# Patient Record
Sex: MALE | Race: WHITE
[De-identification: names, ages, dates, MRNs, and addresses within clinical notes are randomized per-mention and may not be internally consistent; named-entity substitution may affect disease eponyms.]

---

## 2017-03-13 NOTE — EDM.PDOC
ED HPI GENERAL MEDICAL PROBLEM





- General


Chief Complaint: General


Stated Complaint: N/V, weakness, chills


Time Seen by Provider: 17 13:47


Source of Information: Reports: Patient, Family (Wife), Old records ([facility] chart/EMR)





- History of Present Illness


INITIAL COMMENTS - FREE TEXT/NARRATIVE: 





The patient was brought to the emergency room via private automobile by his 

wife for evaluation of sudden onset mild to moderate dizziness, diaphoresis, 

confusion and nausea with 2 episodes of emesis prior to arrival to our 

facility.  The patient also had an additional large emesis immediately on 

arrival to the emergency room.  Symptoms started at about 12 p.m. this 

afternoon with the patient eating lunch at about 11:30 a.m. with no complaints 

at that time.  He was released from an eye clinic in [place] at about 11 a.m. 

with left cataract surgery at that time without apparent complications.  Note 

that the patient did have anesthesia, however had no problems with anesthesia 2 

weeks ago when he had his right cataract surgery in the same office.  His Accu-

Chek at home this morning was 150 mg percent, although he has been n.p.o. since 

8 p.m. yesterday evening. The patient denies any chest pain/pressure, heart 

flutter, orthopnea, paresthesias, recent decreased exercise tolerance, or any 

other anginal-type symptoms.  He has been compliant with cardiac 

rehabilitation. No recent history of abdominal pain, heartburn, diarrhea, melena

, gross hematochezia, or any food intolerance, including fatty foods, etc. with 

a normal bowel movement earlier this morning. The patient also denies any 

recent fever, cough, wheezing, dyspnea, etc..  The patient did take his morning 

medications with exception of his diabetic medications. No history of recent 

headaches, visual changes, diplopia, or other change in neurological status.


Onset: today, sudden


Onset Date: 17


Onset Time: 12:00


Duration: Constant, Getting worse


Location: Reports: other (No pain)


Improves with: Reports: None


Worsens with: Reports: None


Context: Reports: Other (As above)


Associated Symptoms: Reports: confusion, diaphoresis, nausea/vomiting, 

weakness.  Denies: chest pain, cough, fever/chills, headaches, loss of appetite

, malaise, seizure, shortness of breath, syncope





- Related Data


 Allergies











Allergy/AdvReac Type Severity Reaction Status Date / Time


 


No Known Allergies Allergy   Verified 13 08:30











Home Meds: 


 Home Meds





Aspirin 325 mg PO DAILY 17 [History]


Carvedilol 12.5 mg PO BID 17 [History]


Cholecalciferol (Vitamin D3) [Vitamin D3] 1,000 units PO DAILY 17 [History

]


Cyanocobalamin (Vitamin B-12) [Vitamin B-12] 1,000 mcg PO DAILY 17 [

History]


Ginkgo Biloba 60 mg PO DAILY 17 [History]


Ketorolac Tromethamine [Acular] 1 drop EYELF QID 17 [History]


Latanoprost [Xalatan 0.005% Ophth Soln] 1 drop EYEBOTH BEDTIME 17 [History

]


Lisinopril 5 mg PO DAILY 17 [History]


Magnesium Oxide/Mag AA Chelate [Magnesium] 300 mg PO DAILY 17 [History]


Milk Thistle Seed Extract [Milk Thistle] 100 mg PO DAILY 17 [History]


Moxifloxacin [Vigamox 0.5% Ophth Soln] 1 drop EYELF QID 17 [History]


Naproxen Sodium [Aleve] 220 mg PO BID PRN 17 [History]


Ubidecarenone [Co Q-10] 100 mg PO DAILY 17 [History]


metFORMIN HCl [Metformin HCl] 1,000 mg PO BID 17 [History]


prednisoLONE Acetate [Pred Forte 1% Ophth Susp] 1 drop EYERT TID 17 [

History]


prednisoLONE Acetate [Pred Mild] 1 drop EYELF QID 17 [History]











Past Medical History


HEENT History: Reports: Cataract, Glaucoma, Hard of hearing, Impaired vision, 

Other (see below).  Denies: Allergic rhinitis, Macular degeneration, Retinal 

detachment


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 

current hearing aids


Cardiovascular History: Reports: Arrhythmia, CAD, Cardiomyopathy, High 

cholesterol, Hypertension, PVD, Other (see below).  Denies: Afib, Blood clots/

VTE/DVT, Bypass, Heart Failure, Heart murmur, MI, Pacemaker, PTCA, Stents, 

Syncope


Other Cardiovascular History: Incomplete right bundle branch block, left and 

right ventricular hypertrophy with left ventricular systolic dysfunction by 

echocardiogram on 02, coronary artery disease by echocardiogram with 

negative heart catheterization, dyslipidemia, mild carotid occlusive disease


Respiratory History: Reports: COPD, Intubation, previous, Pulmonary fibrosis, 

Other (see below).  Denies: Asthma, Intubation, difficult, PE, Pneumothorax, 

Sleep apnea, TB


Other Respiratory History: COPD and mild pulmonary fibrosis by chest x-ray with 

no current nebulizer therapy


Gastrointestinal History: Reports: Cholelithiasis, Chronic diarrhea, Colon polyp

, Diverticulosis, Pancreatitis, Other (see below).  Denies: Celiac disease, 

Chronic constipation, Gastritis, GERD, GI bleed, Hepatitis, Helicobacter pylori

, Hiatal hernia, Inflammatory bowel disease, Irritable bowel syndrome, Jaundice


Other Gastrointestinal History: Nonsymptomatic cholelithiasis with no surgery 

required diagnosed by CT scan on 12, pancreatitis with mild secondary ileus 

on 12, excision of 3 benign hyperplastic polyps from the rectosigmoid 

region a colonoscopy on 16, sigmoid diverticulosis


Genitourinary History: Reports: BPH, Diabetic nephropathy, Urinary incontinence

, UTI, recurrent, Other (see below).  Denies: Chronic renal insuffiency, 

Dialysis, Renal calculus, STD


Other Genitourinary History: Bladder diverticulum with recurrent UTIs initially 

diagnosed on 6/3/02, mild proteinuria secondary to his diabetes, erectile 

dysfunction, left-sided hydrocele, urosepsis in the 


Musculoskeletal History: Reports: Arthritis, Back pain, chronic, Fracture, Gout

, Neck pain, chronic, Osteoarthritis, Osteoporosis, RA, Other (see below).  

Denies: Amputation, SLE


Other Musculoskeletal History: Positive ISAAC on 02 with no history of lupus

, polymyalgia rheumatica, bilateral pes planus, clavicular fracture at age 10


Neurological History: Reports: Neuropathy, diabetic, Neuropathy, peripheral.  

Denies: Alzheimers disease, Cerebral aneurysms, Concussion, CVA, Headaches, 

chronic, Head trauma, Migraines, MS, Parkinson's, Seizure, TIA


Psychiatric History: Reports: None.  Denies: Abuse, victim of, ADD, ADHD, 

Addiction, Anxiety, Depression, Psych Hospitalization(s), PTSD, Suicide attempt

, Suicidal ideation


Endocrine/Metabolic History: Reports: Diabetes, type II, Osteoporosis, Other (

see below).  Denies: Diabetes, type I, Hypothyroidism, IDDM


Other Endocrine/Metabolic History: Gynecomastia


Hematologic History: Reports: Anemia, B12 deficiency, Blood transfusion(s), 

Other (see below)


Other Hematologic History: transfusion of 2 units on 04 postoperative 

from total knee arthroplasty as below


Immunologic History: Reports: None.  Denies: AIDS, HIV, SLE


Oncologic (Cancer) History: Reports: Bladder, Other (see below).  Denies: Basal 

cell carcinoma, Colon, Hodgkin's Lymphoma, Leukemia, Lymphoma, Malignant 

melanoma, Non-Hodgkin's Lymphoma, Prostate, Squamous cell carcinoma


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


Dermatologic History: Reports: None.  Denies: Eczema, Psoriasis





- Infectious Disease History


Infectious Disease History: Reports: Chicken pox.  Denies: C-difficile, Measles

, Meningitis, Mononucleosis, MRSA, Mumps, Pertussis (whooping cough), Rheumatic 

Fever, Rubella, Scarlet fever, Shingles, TB, VRE





- Past Surgical History


Head Surgeries/Procedures: Reports: None


HEENT Surgical History: Reports: Adenoidectomy, Cataract surgery, Oral surgery, 

Tonsillectomy, Other (see below).  Denies: Eye surgery, Laser surgery, LASIK, 

Myringotomy w tube(s), Naso-sinus surgery


Other HEENT Surgeries/Procedures: Right-sided cataract surgery on 17, left-

sided cataract surgery on 3/13/17 tonsillectomy and adenoidectomy in 1949, 

[place] teeth extraction x4 in 1952


Cardiovascular Surgical History: Reports: None.  Denies: Varicose, Vascular 

surgery


Respiratory Surgical History: Reports: None.  Denies: Lung Biopsies, 

Thoracentesis


GI Surgical History: Reports: Colonoscopy, Hernia, inguinal, Polypectomy, Other 

(see below).  Denies: Appendectomy, Cholecystectomy, EGD, Hernia, abdominal, 

Hernia repair/other


Other GI Surgeries/Procedures: Last colonoscopy on 3/27/12 with previous 

colonoscopy and polypectomy as above on 06, right inguinal hernia repair 

on 12


Male  Surgical History: Reports: TUIP, Other (see below).  Denies: 

Circumcision, Renal Calculus


Other Male  Surgeries/Procedures: Right-sided testicular orchiectomy for 

benign disease in , left hydrocele repair on 05, TUIP with fulguration 

for bladder diverticulum on 02


Endocrine Surgical History: Reports: None


Neurological Surgical History: Reports: None.  Denies: C-Spine, Discectomy, 

Laminectomy, Lumbar spine, Sacral Spine, Vertebroplasty, Other (see below)


Musculoskeletal Surgical History: Reports: Joint replacement, Knee replacement, 

Shoulder surgery, Other (see below).  Denies: Carpal tunnel, Ganglion cyst


Other Musculoskeletal Surgeries/Procedures:: Bilateral total knee arthroplasty 

on 12/10/04, right rotator cuff repair on 01


Oncologic Surgical History: Reports: None


Dermatological Surgical History: Reports: None





- Past Imaging History


Past Imaging History: Reports: Angiography (Heart catheterization on 02), 

Cardiac echo (02), Carotid US (07), CAT scan (CT scan of the abdomen 

and pelvis on 12, CT of the brain on 11), DEXA scan (Last DEXA scan on 

13 with previous evaluation on 11), Ultrasound (Abdominal aortic 

ultrasound on 07, bladder ultrasound on 6/3/02)





Social & Family History





- Family History


HEENT: Reports: None.  Denies: Allergic rhinitis, Glaucoma, Macular degeneration

, Retinal detachment


Cardiac: Reports: CAD, MI, Other (see below).  Denies: Aneurysm, Arrhythmia, 

Blood clots/VTE/DVT, Bypass, Heart failure, High cholesterol, Hypertension, 

Syncope


Other Cardiac Family History: Father with possible fatal MI in his 70s, mother 

with possible fatal MI in her 70s


Respiratory: Reports: None.  Denies: PE


GI: Reports: None.  Denies: Celiac disease, Colon polyps, GERD, GI bleed, 

Inflammatory bowel disease, Irritable bowel syndrome, PUD


: Reports: None.  Denies: Dialysis, Renal calculus


OBGYN: Reports: None.  Denies: Endometriosis, Recurrent spontaneous 


Musculoskeletal: Reports: Osteoarthritis, Other (see below).  Denies: Gout, RA, 

SLE


Other Musculoskeletal Family History: Father, 2 paternal aunts


Neurological: Reports: None.  Denies: Alzheimers disease, Cerebral aneurysms, 

CVA, Dementia, MS, Parkinson's, Seizure, TIA


Psychiatric: Reports: None.  Denies: Abuse, victim of, ADD, ADHD, Anxiety, 

Depression, Psych hospitalization(s), Suicide attempt


Endocrine/Metabolic: Reports: Diabetes, type II, Other (see below).  Denies: 

Hypoparathyroidism, Hypothyroidism, IDDM


Other Endocrine/Metabolic Family History: Father with possible diabetes mellitus


Hematologic: Reports: None.  Denies: Anemia


Immunologic: Reports: None.  Denies: AIDS, HIV, SLE


Dermatologic: Reports: None.  Denies: Eczema, Psoriasis, Seborrheic dermatitis


Oncologic: Reports: Prostate, Other (see below).  Denies: Hodgkin's lymphoma, 

Leukemia, Metastatic, Non-Hodgkin's lymphoma, Skin


Other Oncologic Family History: Father with possible prostate cancer





- Tobacco Use


Smoking Status *Q: Former Smoker


Tobacco Use Within Last Twelve Months: No


Years of Tobacco use: 14


Packs/Tins Daily: 0.8 (Patient smoked 3/4 pack of cigarettes per day between 

ages 21 and 35 with additional occasional cigar use)


Second Hand Smoke Exposure: No





- Caffeine Use


Caffeine Use: Reports: Coffee (2 cups of coffee per day).  Denies: Energy drinks

, Soda, Tea





- Alcohol Use


Alcohol Use History: Yes


Days Per Week of Alcohol Use: 0 (No use since  with borderline alcohol 

abuse with no treatment prior to that time)


Alcohol Use in Last Twelve Months: No





- Recreational Drug Use


Recreational Drug Use: No


Drug Use in Last 12 Months: No


Recreational Drug Type: Denies: Amphetamines (Speed), Cocaine, Heroin, 

Inhalants (Glues, Solvents, Aerosols), LSD (Acid), Marijuana/Hashish, 

Methamphetamine





- Living Situation & Occupation


Living situation: Reports:  ([id number], 2 children, semiretired farmer, toy 

tractor )


Occupation: retired (As above)





ED ROS GENERAL





- Review of Systems


Review Of Systems: See Below


Constitutional: Reports: no symptoms.  Denies: fever, chills, malaise, weakness

, fatigue, night sweats, diaphoresis, decreased appetite, weight loss, weight 

gain


HEENT: Reports: Glasses, Other (Cataract surgery today as above).  Denies: 

Contact Lenses, Dental pain, Ear discharge, Ear pain, Hearing loss, Rhinitis, 

Sinus problem, Throat pain, Vertigo, Vision change


Respiratory: Reports: no symptoms.  Denies: shortness of breath, wheezing, 

pleuritic chest pain, cough, hemoptysis


Cardiovascular: Reports: Blood pressure problem, Lightheadedness, Orthopnea.  

Denies: Chest pain, Claudication, Dyspnea on exertion, Palpitations, Syncope


Endocrine: Reports: no symptoms.  Denies: fatigue


GI/Abdominal: Reports: Nausea, Vomiting.  Denies: Abdominal pain, Anorexia, 

Black stool, Bloody stool, Constipation, Diarrhea, Decreased appetite, 

Difficulty swallowing, Distension, Hematochezia, Melena, Stool incontinence


: Reports: no symptoms.  Denies: discharge, dysuria, flank pain, frequency, 

incontinence, pain, urgency, urinary retention


Musculoskeletal: Reports: no symptoms.  Denies: neck pain, shoulder pain, arm 

pain, back pain, leg pain


Skin: Reports: diaphoresis.  Denies: cyanosis, bruising, pruritis, wound


Neurological: Reports: dizziness, weakness.  Denies: confusion, headache, 

numbness, paresthesia, seizure, syncope, tingling, trouble speaking, difficulty 

walking, change in speech, gait disturbance


Psychiatric: Reports: Confusion.  Denies: Agitation, Anxiety, Cravings, 

Depression


Hematologic/Lymphatic: Reports: no symptoms


Immunologic: Reports: no symptoms





ED EXAM, GENERAL





- Physical Exam


Exam: See Below


Exam Limited By: No limitations


General Appearance: alert, WD/WN, no apparent distress


Eye Exam: left eye: abnormal pupil (Dilated left pupils secondary to cataract 

surgery earlier this morning), bilateral eye: EOMI, normal fundi


Ears: normal external exam, normal canal, normal TMs, hearing loss (Bilateral 

presbycusis-mild)


Nose: normal inspection, normal mucosa, no blood


Throat/Mouth: Normal inspection, Normal lips, Normal teeth, Normal gums, Normal 

oropharynx, Normal voice, No airway compromise.  No: Dysphagia, Perioral 

cyanosis


Head: atraumatic, normocephalic.  No: facial swelling, facial tenderness, sinus 

tenderness


Neck: supple, non-tender, full range of motion, carotid bruit (Bilateral 

carotid bruits-mild).  No: lymphadenopathy (L), lymphadenopathy (R), thyromegaly


Respiratory/Chest: no respiratory distress, lungs clear, normal breath sounds, 

no accessory muscle use, chest non-tender.  No: retractions


Cardiovascular: normal peripheral pulses, no edema, no gallop, no JVD, no murmur

, no rub, tachycardia (Regular rhythm).  No: gallop/S3, gallop/S4, friction rub


Peripheral Pulses: 0: dorsalis pedis (L), dorsalis pedis (R), 2+: radial (L), 

radial (R)


GI/Abdominal: normal bowel sounds, soft, non tender, no organomegaly, no 

distention, no abnormal bruit, no mass.  No: guarding


 (Male) Exam: Deferred


Rectal (Males) Exam: Deferred


Back Exam: normal inspection, full range of motion.  No: CVA tenderness (L), 

CVA tenderness (R), muscle spasm


Extremities: normal inspection, normal range of motion, non-tender, no pedal 

edema, normal capillary refill.  No: Sree's Sign


Neurological: alert, oriented, CN II-XII intact, normal gait, normal reflexes (

Negative Babinski's), no motor/sensory deficits, confused (Mild confusion)


Psychiatric: normal affect, normal mood


Skin Exam: Warm, Dry, Intact, Normal color, No rash.  No: Diaphoretic, Pallor, 

Rash, Wound/incision


Lymphatic: no adenopathy





EKG INTERPRETATION


EKG Date: 17


Time: 14:22


Rhythm: other (Sinus tachycardia)


Rate (beats/min): 116


Axis: LAD-left axis deviation (Extended left cardiac axis)


P-wave: enlarged (Moderate diffuse biphasic P waves with extreme poor R-wave 

progression anteriorly)


QRS: wide (QRS interval of 0.10 seconds representing repolarization changes)


ST-T: normal


QT: normal


DE/PQ Interval: 0.18 seconds


Comparison: change from previous EKG (As below)


EKG Interpretation Comments: 





1.  No acute ischemic change


2.  Probable left ventricular enlargement


3.  Sinus tachycardia


4.  Repolarization changes since borderline incomplete right bundle branch block





Improvement of sinus tachycardia since EKG today at 13:57 hours with no other 

changes in the EKG and no change since last previous distant EKG on 06 

with exception of resolution of previous T-wave inversions in leads 3 and V1





Course





- Vital Signs


Last Recorded V/S: 


 Last Vital Signs











Temp  36.6 C   17 14:00


 


Pulse  107 H  17 15:22


 


Resp  26 H  17 15:22


 


BP  98/62   17 15:22


 


Pulse Ox  98   17 15:22














 Vital Signs - 24 hr











  17





  14:00 14:15 14:17


 


Temperature [ 36.6 C  





Oral]   


 


Pulse,   143 H





Peripheral   


 


Pulse, 150 H 160 H 





Peripheral [   





Right Brachial]   


 


Respiratory 25 H 25 H 





Rate   


 


Blood Pressure   123/99 H


 


Blood Pressure   





[Left Upper Arm   





]   


 


Blood Pressure 125/99 H 130/114 H 





[Right Upper   





Arm]   


 


O2 Sat by Pulse 92 L 92 L 





Oximetry   














  17





  14:20 14:25 14:35


 


Temperature [   





Oral]   


 


Pulse,  121 H 





Peripheral   


 


Pulse,   112 H





Peripheral [   





Right Brachial]   


 


Respiratory   23 H





Rate   


 


Blood Pressure  114/92 H 


 


Blood Pressure 114/92 H  92/62





[Left Upper Arm   





]   


 


Blood Pressure   





[Right Upper   





Arm]   


 


O2 Sat by Pulse   92 L





Oximetry   














  17





  14:50 15:22


 


Temperature [  





Oral]  


 


Pulse,  





Peripheral  


 


Pulse,  107 H





Peripheral [  





Right Brachial]  


 


Respiratory 22 H 26 H





Rate  


 


Blood Pressure  


 


Blood Pressure 90/60 98/62





[Left Upper Arm  





]  


 


Blood Pressure  





[Right Upper  





Arm]  


 


O2 Sat by Pulse 90 L 98





Oximetry  














- Orders/Labs/Meds


Orders: 


 Active Orders 24 hr











 Category Date Time Status


 


 Blood Glucose Check, Bedside [RC] STAT Care  17 13:48 Active


 


 Cardiac Monitoring [RC] .AS DIRECTED Care  17 13:47 Active


 


 EKG Documentation Completion [RC] ASDIRECTED Care  17 13:47 Active


 


 EKG Documentation Completion [RC] ASDIRECTED Care  17 14:20 Active


 


 Oxygen Therapy, ED [RC] CONTINUOUS Care  17 13:47 Active


 


 Peripheral IV Care [RC] .AS DIRECTED Care  17 13:47 Active


 


 Pulse Oximetry [RC] CONTINUOUS Care  17 13:47 Active


 


 Up With Assistance [RC] PFP Care  17 13:47 Active


 


 Vital Signs [RC] PFP Care  17 13:47 Active


 


 Nothing per Oral Now Diet [DIET] Diet  17 Breakfast Active


 


 Abdomen 1V Flat [CR] Stat Exams  17 14:52 Taken


 


 Chest 1V Frontal [CR] Stat Exams  17 13:47 Taken


 


 Chest PE [Ang Chest] [CT] Stat Exams  17 14:38 Taken


 


 Sodium Chloride 0.9% [Saline Flush] Med  17 13:47 Active





 10 ml FLUSH ASDIRECTED PRN   


 


 Obtain Past Medical Record [OM.PC] Urgent Oth  17 13:47 Active


 


 Peripheral IV Insertion Adult [OM.PC] Stat Oth  17 13:47 Ordered


 


 Resuscitation Status Stat Resus Stat  17 13:47 Ordered








 Medication Orders





Sodium Chloride (Saline Flush)  10 ml FLUSH ASDIRECTED PRN


   PRN Reason: Keep Vein Open


   Last Admin: 17 14:30  Dose: 10 ml


   Admin: 17 14:19  Dose: 10 ml








Labs: 


 Laboratory Tests











  17 Range/Units





  14:00 14:00 14:00 


 


WBC  5.3    (4.0-10.2)  K/uL


 


RBC  5.40    (4.33-5.41)  M/uL


 


Hgb  16.2  D    (13.1-16.8)  g/dL


 


Hct  48.9    (39.0-49.0)  %


 


MCV  90.6    (84.0-98.0)  fL


 


MCH  30.0    (28.2-33.3)  pg


 


MCHC  33.1    (31.7-36.0)  g/dL


 


RDW  12.9    (11.2-14.1)  %


 


Plt Count  210    (150-350)  K/uL


 


Neut % (Auto)  89.2 H    (45.0-80.0)  %


 


Lymph % (Auto)  10.2    (10.0-50.0)  %


 


Mono % (Auto)  0.2 L    (2.0-14.0)  %


 


Eos % (Auto)  0.2    (0.0-5.0)  %


 


Baso % (Auto)  0.2    (0.0-2.0)  %


 


Neut #  4.73    (1.40-7.00)  K/uL


 


Lymph #  0.54    (0.50-3.50)  K/uL


 


Mono #  0.01    (0.00-1.00)  K/uL


 


Eos #  0.01    (0.00-0.50)  K/uL


 


Baso #  0.01    (0.00-0.20)  K/uL


 


PT   12.2 H   (9.8-11.7)  SEC


 


INR   1.1   


 


APTT   23.6   (23.5-30.0)  SEC


 


D-Dimer, Quantitative    3790 H  (0-400)  ng/mL


 


Sodium     (136-145)  mmol/L


 


Potassium     (3.5-5.1)  mmol/L


 


Chloride     ()  mmol/L


 


Carbon Dioxide     (21.0-32.0)  mmol/L


 


BUN     (7-18)  mg/dL


 


Creatinine     (0.51-1.17)  mg/dL


 


Est Cr Clr Drug Dosing     mL/min


 


Estimated GFR (MDRD)     mL/min


 


Glucose     ()  mg/dL


 


Hemoglobin A1c     (4.3-5.7)  %


 


Lactic Acid     (0.4-2.0)  mmol/L


 


Uric Acid     (2.6-7.2)  mg/dL


 


Calcium     (8.5-10.1)  mg/dL


 


Magnesium     (1.8-2.4)  mg/dL


 


Total Bilirubin     (0.2-1.0)  mg/dL


 


AST     (15-37)  U/L


 


ALT     (12-78)  U/L


 


Alkaline Phosphatase     ()  IU/L


 


Creatine Kinase     ()  U/L


 


Creatine Kinase Index     (0.0-2.5)  %


 


CK-MB (CK-2)     (0.00-3.60)  ng/mL


 


Troponin I     (0.000-0.056)  ng/mL


 


Pro-B-Natriuretic Pept     (0-125)  pg/mL


 


Total Protein     (6.4-8.2)  g/dL


 


Albumin     (3.4-5.0)  g/dL


 


Amylase     ()  U/L


 


Lipase     ()  U/L


 


TSH, Ultra Sensitive     (0.358-3.740)  mIU/mL


 


H. pylori IgG Antibody     (NEGATIVE)  














  17 Range/Units





  14:00 14:00 14:00 


 


WBC     (4.0-10.2)  K/uL


 


RBC     (4.33-5.41)  M/uL


 


Hgb     (13.1-16.8)  g/dL


 


Hct     (39.0-49.0)  %


 


MCV     (84.0-98.0)  fL


 


MCH     (28.2-33.3)  pg


 


MCHC     (31.7-36.0)  g/dL


 


RDW     (11.2-14.1)  %


 


Plt Count     (150-350)  K/uL


 


Neut % (Auto)     (45.0-80.0)  %


 


Lymph % (Auto)     (10.0-50.0)  %


 


Mono % (Auto)     (2.0-14.0)  %


 


Eos % (Auto)     (0.0-5.0)  %


 


Baso % (Auto)     (0.0-2.0)  %


 


Neut #     (1.40-7.00)  K/uL


 


Lymph #     (0.50-3.50)  K/uL


 


Mono #     (0.00-1.00)  K/uL


 


Eos #     (0.00-0.50)  K/uL


 


Baso #     (0.00-0.20)  K/uL


 


PT     (9.8-11.7)  SEC


 


INR     


 


APTT     (23.5-30.0)  SEC


 


D-Dimer, Quantitative     (0-400)  ng/mL


 


Sodium  141    (136-145)  mmol/L


 


Potassium  4.7    (3.5-5.1)  mmol/L


 


Chloride  104    ()  mmol/L


 


Carbon Dioxide  23.0    (21.0-32.0)  mmol/L


 


BUN  20 H    (7-18)  mg/dL


 


Creatinine  1.22 H    (0.51-1.17)  mg/dL


 


Est Cr Clr Drug Dosing  51.24    mL/min


 


Estimated GFR (MDRD)  57    mL/min


 


Glucose  226 H    ()  mg/dL


 


Hemoglobin A1c     (4.3-5.7)  %


 


Lactic Acid   7.7 H   (0.4-2.0)  mmol/L


 


Uric Acid  7.0    (2.6-7.2)  mg/dL


 


Calcium  9.3    (8.5-10.1)  mg/dL


 


Magnesium  1.4 L    (1.8-2.4)  mg/dL


 


Total Bilirubin  0.7    (0.2-1.0)  mg/dL


 


AST  23    (15-37)  U/L


 


ALT  26    (12-78)  U/L


 


Alkaline Phosphatase  75    ()  IU/L


 


Creatine Kinase  114    ()  U/L


 


Creatine Kinase Index  1.4    (0.0-2.5)  %


 


CK-MB (CK-2)  1.60    (0.00-3.60)  ng/mL


 


Troponin I  0.008    (0.000-0.056)  ng/mL


 


Pro-B-Natriuretic Pept  364 H    (0-125)  pg/mL


 


Total Protein  7.1    (6.4-8.2)  g/dL


 


Albumin  3.6    (3.4-5.0)  g/dL


 


Amylase  54    ()  U/L


 


Lipase  153    ()  U/L


 


TSH, Ultra Sensitive  2.569    (0.358-3.740)  mIU/mL


 


H. pylori IgG Antibody    Negative  (NEGATIVE)  














  17 Range/Units





  14:00 


 


WBC   (4.0-10.2)  K/uL


 


RBC   (4.33-5.41)  M/uL


 


Hgb   (13.1-16.8)  g/dL


 


Hct   (39.0-49.0)  %


 


MCV   (84.0-98.0)  fL


 


MCH   (28.2-33.3)  pg


 


MCHC   (31.7-36.0)  g/dL


 


RDW   (11.2-14.1)  %


 


Plt Count   (150-350)  K/uL


 


Neut % (Auto)   (45.0-80.0)  %


 


Lymph % (Auto)   (10.0-50.0)  %


 


Mono % (Auto)   (2.0-14.0)  %


 


Eos % (Auto)   (0.0-5.0)  %


 


Baso % (Auto)   (0.0-2.0)  %


 


Neut #   (1.40-7.00)  K/uL


 


Lymph #   (0.50-3.50)  K/uL


 


Mono #   (0.00-1.00)  K/uL


 


Eos #   (0.00-0.50)  K/uL


 


Baso #   (0.00-0.20)  K/uL


 


PT   (9.8-11.7)  SEC


 


INR   


 


APTT   (23.5-30.0)  SEC


 


D-Dimer, Quantitative   (0-400)  ng/mL


 


Sodium   (136-145)  mmol/L


 


Potassium   (3.5-5.1)  mmol/L


 


Chloride   ()  mmol/L


 


Carbon Dioxide   (21.0-32.0)  mmol/L


 


BUN   (7-18)  mg/dL


 


Creatinine   (0.51-1.17)  mg/dL


 


Est Cr Clr Drug Dosing   mL/min


 


Estimated GFR (MDRD)   mL/min


 


Glucose   ()  mg/dL


 


Hemoglobin A1c  5.9 H  (4.3-5.7)  %


 


Lactic Acid   (0.4-2.0)  mmol/L


 


Uric Acid   (2.6-7.2)  mg/dL


 


Calcium   (8.5-10.1)  mg/dL


 


Magnesium   (1.8-2.4)  mg/dL


 


Total Bilirubin   (0.2-1.0)  mg/dL


 


AST   (15-37)  U/L


 


ALT   (12-78)  U/L


 


Alkaline Phosphatase   ()  IU/L


 


Creatine Kinase   ()  U/L


 


Creatine Kinase Index   (0.0-2.5)  %


 


CK-MB (CK-2)   (0.00-3.60)  ng/mL


 


Troponin I   (0.000-0.056)  ng/mL


 


Pro-B-Natriuretic Pept   (0-125)  pg/mL


 


Total Protein   (6.4-8.2)  g/dL


 


Albumin   (3.4-5.0)  g/dL


 


Amylase   ()  U/L


 


Lipase   ()  U/L


 


TSH, Ultra Sensitive   (0.358-3.740)  mIU/mL


 


H. pylori IgG Antibody   (NEGATIVE)  








Stat Accu-Chek on arrival 160 mg percent 


Meds: 


Medications











Generic Name Dose Route Start Last Admin





  Trade Name Freq  PRN Reason Stop Dose Admin


 


Sodium Chloride  10 ml  17 13:47  17 14:30





  Saline Flush  FLUSH   10 ml





  ASDIRECTED PRN   Administration





  Keep Vein Open   














Discontinued Medications














Generic Name Dose Route Start Last Admin





  Trade Name Freq  PRN Reason Stop Dose Admin


 


Famotidine  40 mg  17 13:47  17 14:29





  Pepcid  IVPUSH  17 13:48  40 mg





  ONETIME ONE   Administration


 


Iopamidol  100 ml  17 14:40  17 15:01





  Isovue-370 (76%)  IVPUSH  17 14:41  100 ml





  ONETIME ONE   Administration


 


Metoprolol Tartrate  2.5 mg  17 14:03  17 14:17





  Lopressor  IVPUSH  17 14:04  2.5 mg





  ONETIME ONE   Administration


 


Metoprolol Tartrate  2.5 mg  17 14:19  17 14:25





  Lopressor  IVPUSH  17 14:20  2.5 mg





  ONETIME ONE   Administration


 


Ondansetron HCl  4 mg  17 14:03  17 14:19





  Zofran  IVPUSH  17 14:04  4 mg





  ONETIME ONE   Administration


 


Ondansetron HCl  4 mg  17 14:52  17 15:10





  Zofran  IVPUSH  17 14:53  4 mg





  ONETIME ONE   Administration














- Radiology Interpretation


Free Text/Narrative:: 





Cardiac monitor showed heart rate in the 100s to 110s after treatment in the 

emergency room as above with initial cardiac monitoring by means of EKG machine 

with heart rates in the 130s to 160s showing sinus tachycardia, however no 

other ectopy or arrhythmia





Chest x-ray, portable, shows evidence of moderate COPD changes with probable 

pulmonary hypertension and mild centralized CHF with mild prominence of the 

proximal aortic arch, however no cardiomegaly, pulmonary infiltrates, 

pneumothorax, etc.





Abdominal x-rays, flat, shows moderate bowel gaseous distention with no free air

, ileus, or obstruction with phleboliths noted in the pelvic region.  Possible 

left renal nephrolithiasis





Telephone consultation at 15:36 hours with the radiology department at [facility] with negative CTA of the chest for PE, etc.


CT Results Date: 17


CT Results Time: 15:36





Departure





- Departure


Time of Disposition: 16:10


Disposition: Refer to Observation


Condition: good


Clinical Impression: 


 Sinus tachycardia, D-dimer, elevated, Hypomagnesemia, Lactic acid blood 

increased





Coronary artery disease


Qualifiers:


 Coronary Disease-Associated Artery/Lesion type: native artery Native vs. 

transplanted heart: native heart Associated angina: without angina Qualified 

Code(s): I25.10 - Atherosclerotic heart disease of native coronary artery 

without angina pectoris





CHF (congestive heart failure)


Qualifiers:


 Congestive heart failure type: systolic Congestive heart failure chronicity: 

acute on chronic Qualified Code(s): I50.23 - Acute on chronic systolic (

congestive) heart failure





Diabetes mellitus


Qualifiers:


 Diabetes mellitus type: type 2 Diabetes mellitus complication status: with 

kidney complications Diabetes mellitus complication detail: with 

microalbuminuria Diabetes mellitus long term insulin use: without long term use 

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 

complication





Osteoarthritis


Qualifiers:


 Osteoarthritis location: multiple joints Osteoarthritis type: primary 

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





COPD (chronic obstructive pulmonary disease)


Qualifiers:


 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 

Panlobular emphysema





Nausea & vomiting


Qualifiers:


 Vomiting type: unspecified Vomiting Intractability: non-intractable Qualified 

Code(s): R11.2 - Nausea with vomiting, unspecified





Cataract


Qualifiers:


 Cataract type: age-related Age-related cataract type: cortical Laterality: 

bilateral Qualified Code(s): H25.013 - Cortical age-related cataract, bilateral








- Problem List & Annotations


(1) Coronary artery disease


SNOMED Code(s): 67037840


   Code(s): I25.10 - ATHSCL HEART DISEASE OF NATIVE CORONARY ARTERY W/O ANG 

PCTRS   Status: Chronic   Priority: High   Current Visit: Yes   Annotation/

Comment:: Chest pain protocol not initiated in the emergency room secondary to 

absence of chest pain  and also secondary to his eye surgery earlier today with 

ASA and Plavix not given.  Patient did require IV Lopressor therapy secondary 

to sinus tachycardia as below, however.  Initiate  standard routine rule out MI 

orders.  Cardiology consultation on an as-needed basis.  Consider Cardiolite 

evaluation on an outpatient basis   


Qualifiers: 


   Coronary Disease-Associated Artery/Lesion type: native artery   Native vs. 

transplanted heart: native heart   Associated angina: without angina   

Qualified Code(s): I25.10 - Atherosclerotic heart disease of native coronary 

artery without angina pectoris   





(2) Sinus tachycardia


SNOMED Code(s): 27857328


   Code(s): R00.0 - TACHYCARDIA, UNSPECIFIED   Status: Acute   Priority: High   

Current Visit: Yes   Onset Date: 17   Annotation/Comment:: Overall good 

response to 2 doses of low-dose IV Lopressor.  Continue cardiac monitoring and 

cardiac workup as below. Consider event monitor, etc. depending on his clinical 

course.  TSH is normal   





(3) CHF (congestive heart failure)


SNOMED Code(s): 77928826


   Code(s): I50.9 - HEART FAILURE, UNSPECIFIED   Status: Acute   Priority: 

Medium   Current Visit: Yes   Onset Date: 17   Annotation/Comment:: Mild 

borderline CHF secondary to sinus tachycardia.  No chest pain or anginal 

complaints with only mild BNP elevation with secondary changes troponin I was 

otherwise negative EKG.  No clinically significant CHF by exam today.  

Echocardiogram in the a.m.   


Qualifiers: 


   Congestive heart failure type: systolic   Congestive heart failure chronicity

: acute on chronic   Qualified Code(s): I50.23 - Acute on chronic systolic (

congestive) heart failure   





(4) COPD (chronic obstructive pulmonary disease)


SNOMED Code(s): 80070509


   Code(s): J44.9 - CHRONIC OBSTRUCTIVE PULMONARY DISEASE, UNSPECIFIED   Status

: Chronic   Priority: Medium   Current Visit: Yes   Annotation/Comment:: No 

recent fever or bronchitic-type symptoms with patient not currently under 

nebulizer treatments, etc.  Consider PFTs on an outpatient basis.  No evidence 

of aspiration today   


Qualifiers: 


   COPD type: emphysema   Emphysema type: panlobular   Qualified Code(s): J43.1 

- Panlobular emphysema   





(5) Cataract


SNOMED Code(s): 938321135


   Code(s): H26.9 - UNSPECIFIED CATARACT   Status: Acute   Priority: High   

Current Visit: Yes   Onset Date: 17   Annotation/Comment:: Left sided 

cataract surgery today.  Possible complications from anesthesia versus cardiac 

etiology as above.  Ophthalmology consultation depending on clinical course   


Qualifiers: 


   Cataract type: age-related   Age-related cataract type: cortical   Laterality

: bilateral   Qualified Code(s): H25.013 - Cortical age-related cataract, 

bilateral   





(6) D-dimer, elevated


SNOMED Code(s): 055391283


   Code(s): R79.89 - OTHER SPECIFIED ABNORMAL FINDINGS OF BLOOD CHEMISTRY   

Status: Acute   Current Visit: Yes   





(7) Diabetes mellitus


SNOMED Code(s): 01450369


   Code(s): E11.9 - TYPE 2 DIABETES MELLITUS WITHOUT COMPLICATIONS   Status: 

Chronic   Priority: Medium   Current Visit: Yes   Annotation/Comment:: Accu-

Chek stable at home by patient history.  Glycosylated hemoglobin today   


Qualifiers: 


   Diabetes mellitus type: type 2   Diabetes mellitus complication status: with 

kidney complications   Diabetes mellitus complication detail: with 

microalbuminuria   Diabetes mellitus long term insulin use: without long term 

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 

kidney complication; R80.9 - Proteinuria, unspecified   





(8) Hypomagnesemia


SNOMED Code(s): 674659464


   Code(s): E83.42 - HYPOMAGNESEMIA   Status: Acute   Priority: Medium   

Current Visit: Yes   Onset Date: 17   Annotation/Comment:: Initially 

magnesium oxide therapy   





(9) Lactic acid blood increased


SNOMED Code(s): 1829769


   Code(s): R79.89 - OTHER SPECIFIED ABNORMAL FINDINGS OF BLOOD CHEMISTRY   

Status: Acute   Priority: High   Current Visit: Yes   Onset Date: 17   

Annotation/Comment:: Lactic acid to be repeated with next set of cardiac 

enzymes.  No true acidosis noted.  Observe his borderline confusion closely 

with neuro checks with vitals   





(10) Nausea & vomiting


SNOMED Code(s): 08777256


   Code(s): R11.2 - NAUSEA WITH VOMITING, UNSPECIFIED   Status: Acute   Priority

: High   Current Visit: Yes   Onset Date: 17   Annotation/Comment:: 

Repeat blood work in the a.m. with additional acute abdominal x-rays in the 

morning. Abdominal assessments with vitals.  Symptoms resolved with 2 doses of 

IV Zofran.  IV fluids with caution secondary to his weakness and NPO status 

since yesterday.  H. pylori, amylase, and lipase are normal    


Qualifiers: 


   Vomiting type: unspecified   Vomiting Intractability: non-intractable   

Qualified Code(s): R11.2 - Nausea with vomiting, unspecified   





(11) Osteoarthritis


SNOMED Code(s): 989099046


   Code(s): M19.90 - UNSPECIFIED OSTEOARTHRITIS, UNSPECIFIED SITE   Status: 

Chronic   Priority: Medium   Current Visit: Yes   Annotation/Comment:: Stable 

by history.  History of hyperuricemia and rheumatoid arthritis   


Qualifiers: 


   Osteoarthritis location: multiple joints   Osteoarthritis type: primary   

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   





- Problem List Review


Problem List Initiated/Reviewed/Updated: Yes





- My Orders


Last 24 Hours: 


My Active Orders





17 13:47


Cardiac Monitoring [RC] .AS DIRECTED 


EKG Documentation Completion [RC] ASDIRECTED 


Oxygen Therapy, ED [RC] CONTINUOUS 


Peripheral IV Care [RC] .AS DIRECTED 


Pulse Oximetry [RC] CONTINUOUS 


Up With Assistance [RC] PFP 


Vital Signs [RC] PFP 


Chest 1V Frontal [CR] Stat 


Sodium Chloride 0.9% [Saline Flush]   10 ml FLUSH ASDIRECTED PRN 


Obtain Past Medical Record [OM.PC] Urgent 


Peripheral IV Insertion Adult [OM.PC] Stat 


Resuscitation Status Stat 





17 13:48


Blood Glucose Check, Bedside [RC] STAT 





17 14:20


EKG Documentation Completion [RC] ASDIRECTED 





17 14:38


Chest PE [Ang Chest] [CT] Stat 





17 14:52


Abdomen 1V Flat [CR] Stat 





17 Breakfast


Nothing per Oral Now Diet [DIET] 














- Assessment/Plan


Admission H&P: Please use this note as an admission H&P


Last 24 Hours: 


My Active Orders





17 13:47


Cardiac Monitoring [RC] .AS DIRECTED 


EKG Documentation Completion [RC] ASDIRECTED 


Oxygen Therapy, ED [RC] CONTINUOUS 


Peripheral IV Care [RC] .AS DIRECTED 


Pulse Oximetry [RC] CONTINUOUS 


Up With Assistance [RC] PFP 


Vital Signs [RC] PFP 


Chest 1V Frontal [CR] Stat 


Sodium Chloride 0.9% [Saline Flush]   10 ml FLUSH ASDIRECTED PRN 


Obtain Past Medical Record [OM.PC] Urgent 


Peripheral IV Insertion Adult [OM.PC] Stat 


Resuscitation Status Stat 





17 13:48


Blood Glucose Check, Bedside [RC] STAT 





17 14:20


EKG Documentation Completion [RC] ASDIRECTED 





17 14:38


Chest PE [Ang Chest] [CT] Stat 





17 14:52


Abdomen 1V Flat [CR] Stat 





17 Breakfast


Nothing per Oral Now Diet [DIET] 











Assessment:: 





As above


Plan: 





As above. Extensive precautions were given to the patient and his wife, who are 

in agreement with the treatment plan. The patient's condition is stable enough 

for observation status and general supervision.

## 2017-03-14 NOTE — [id number]
**Discharge Summary





- Hospital Course


HPI Initial Comments: 


See emergency room note/admission H&P


Brief History: See emergency room note/admission H&P





- Discharge Data


Discharge Date: 03/14/17


Discharge Disposition: DC/Tfer to Acute Hospital 02


Condition: Serious





- Discharge Diagnosis/Problem(s)


(1) Hypotension


SNOMED Code(s): 09399103


   ICD Code: I95.9 - HYPOTENSION, UNSPECIFIED   Status: Acute   Priority: High 

  Current Visit: Yes   Onset Date: 03/14/17   Problem Details: Refractory 

hypotension of unknown etiology despite aggressive therapy, including IV fluids

, etc..  Telephone consultation at 06:30 hours with [facility] 

with no bed available in that facility.  Subsequent telephone consultation at 06

:35 AM with Dr. Coleman, intensivist at [facility], who does agree 

to place the patient on a waiting list in their facility.  Secondary to delayed 

patient transfer abdominal ultrasound to rule out a AAA was ordered. Telephone 

consultation at 09:15 hours with US TechRisa, from our facility with 

negative verbal report for AAA and DVT for just completed Abd. US-Limited and 

venous Doppler of lower extremities.  Per  recommendations from Dr. Coleman 

initiate IV norepinephrine infusion rather than Dopamine secondary to recent 

sinus tachycardia. Low dose, weight base, slow titration initiated with 

improved SBP of 90 prior to initiation of this infusion. Initially suspected 

possible sepsis, however note improvement of lactic acid and resolution of 

fever after only one dose of Tylenol at midnight.  Patient's blood pressures 

did initially improve with IV bolus of lactated Ringer's and subsequent 

increased IV fluids with additional 250 mL bolus of D5 normal saline given 

earlier this morning.  Secondary to d-dimer elevation strongly suspicious of 

possible dissecting AAA, although this could not be confirmed by physical exam.

  Stat AAA ultrasound was not immediately available during the evening but 

negative this morning as above. CT of the abdomen not conducted secondary to 

patient's worsening renal function.  IV dopamine infusion was also not 

initiated earlier this evening secondary to AAA suspicion.  Cardiology etiology 

remains a differential diagnosis.  Note previous history of hypertension with 

lisinopril and Coreg held to this point.





Telephone consultation at 12:20 hours with [facility], with ICU 

bed available at this time and that facility accepting the patient.  Note 

persistent refractory hypotension with IV norepinephrine infusion increased to 

0.06 mcg per kilogram per minute shortly prior to patient's transfer.  Systolic 

blood pressures still in the 80s with otherwise stable status prior to transfer 

with no tachycardia to this point.  No sequelae from delay in patient transfer.

   


Qualifiers: 


   Hypotension type: other hypotension type   Qualified Code(s): I95.89 - Other 

hypotension   





(2) Sepsis


SNOMED Code(s): 78977865


   ICD Code: A41.9 - SEPSIS, UNSPECIFIED ORGANISM   Status: Acute   Priority: 

High   Current Visit: Yes   Onset Date: 03/13/17   Problem Details: Symptomatic 

sepsis likely secondary to aspiration pneumonia with secondary hypotension as 

above   


Qualifiers: 


   Sepsis type: sepsis due to unspecified organism   Qualified Code(s): A41.9 - 

Sepsis, unspecified organism   





(3) CHF (congestive heart failure)


SNOMED Code(s): 37143298


   ICD Code: I50.9 - HEART FAILURE, UNSPECIFIED   Status: Acute   Priority: 

Medium   Current Visit: Yes   Onset Date: 03/13/17   Problem Details: Mild 

progressive CHF secondary to required IV fluids as above. Mild borderline CHF 

on admission secondary to sinus tachycardia.  No chest pain or anginal 

complaints despite significant hypotension as above. No clinically significant 

CHF by exam on admission with IV Lasix therapy not initiated on admission and 

will be continued to be held at this time secondary to required IV fluids for 

his hypotension. Echocardiogram could not be performed are to patient's 

transfer with this study advisable depending on patient's clinical course as 

above.   


Qualifiers: 


   Congestive heart failure type: systolic   Congestive heart failure chronicity

: acute on chronic   Qualified Code(s): I50.23 - Acute on chronic systolic (

congestive) heart failure   





(4) COPD (chronic obstructive pulmonary disease)


SNOMED Code(s): 38970401


   ICD Code: J44.9 - CHRONIC OBSTRUCTIVE PULMONARY DISEASE, UNSPECIFIED   Status

: Chronic   Priority: Medium   Current Visit: Yes   Problem Details: Note 

development of fever yesterday evening with suspicion of possible aspiration 

pneumonia secondary to recurrent emesis prior to admission and during initial 

emergency room evaluation.  Blood cultures x2 were collected yesterday evening 

with additional initiation of IV Rocephin and IV Flagyl.  The patient did not 

have any recent fever or bronchitic-type symptoms prior to admission and did 

not require nebulizer treatments during hospitalization with patient previously 

not under nebulizer treatments, etc.  Consider PFTs on an outpatient basis.  No 

evidence of aspiration prior to admission, however note RML infiltrates today. 

Leukocytosis significantly improved this AM.    


Qualifiers: 


   COPD type: emphysema   Emphysema type: panlobular   Qualified Code(s): J43.1 

- Panlobular emphysema   





(5) Aspiration pneumonia due to food (regurgitated)


SNOMED Code(s): 71538592


   ICD Code: J69.0 - PNEUMONITIS DUE TO INHALATION OF FOOD AND VOMIT   Status: 

Acute   Priority: High   Current Visit: Yes   Onset Date: 03/13/17   Problem 

Details: As above   


Qualifiers: 


   Laterality: right   Lung location: middle lobe of lung   Qualified Code(s): 

J69.0 - Pneumonitis due to inhalation of food and vomit   





(6) Coronary artery disease


SNOMED Code(s): 06394196


   ICD Code: I25.10 - ATHSCL HEART DISEASE OF NATIVE CORONARY ARTERY W/O ANG 

PCTRS   Status: Chronic   Priority: High   Current Visit: Yes   Problem Details

: Serial cardiac enzymes stable with no EKG changes consistent with acute MI.  

Mildly progressive BNP and CHF with mild fluid overload by chest x-ray.  No 

chest pain or anginal-type symptoms currently despite patient's severe 

hypotension as above. Chest pain protocol not initiated in the emergency room 

secondary to absence of chest pain  and also secondary to his eye surgery prior 

to admission with ASA and Plavix not given. Patient did require IV Lopressor 

therapy secondary to sinus tachycardia as below, however. Cardiology 

consultation on an as-needed basis.  Consider Cardiolite evaluation on an 

outpatient basis depending on his clinical course   


Qualifiers: 


   Coronary Disease-Associated Artery/Lesion type: native artery   Native vs. 

transplanted heart: native heart   Associated angina: without angina   

Qualified Code(s): I25.10 - Atherosclerotic heart disease of native coronary 

artery without angina pectoris   





(7) Sinus tachycardia


SNOMED Code(s): 67127047


   ICD Code: R00.0 - TACHYCARDIA, UNSPECIFIED   Status: Acute   Priority: High 

  Current Visit: Yes   Onset Date: 03/13/17   Problem Details: Despite holding 

yesterday's evening dose of Coreg, no return of patient's tachycardia. Overall 

good response to 2 doses of low-dose IV Lopressor in the emergency room.  

Continue cardiac monitoring during transfer.  Further cardiac and/or vascular 

surgical workup depending on etiology of patient's hypotension as above.  TSH 

is normal on admission   





(8) Cataract


SNOMED Code(s): 467370943


   ICD Code: H26.9 - UNSPECIFIED CATARACT   Status: Acute   Priority: High   

Current Visit: Yes   Onset Date: 03/13/17   Problem Details: Left sided 

cataract surgery yesterday.  Initially suspected possible complications from 

anesthesia versus cardiac etiology as above.  Ophthalmology consultation 

depending on clinical course. Persistent left dilated pupil this AM.    


Qualifiers: 


   Cataract type: age-related   Age-related cataract type: cortical   Laterality

: bilateral   Qualified Code(s): H25.013 - Cortical age-related cataract, 

bilateral   





(9) D-dimer, elevated


SNOMED Code(s): 596809689


   ICD Code: R79.89 - OTHER SPECIFIED ABNORMAL FINDINGS OF BLOOD CHEMISTRY   

Status: Acute   Priority: High   Current Visit: Yes   Onset Date: 03/13/17   

Problem Details: D-dimer elevation on admission with negative CTA of the chest 

yesterday with no evidence of PE or thoracic aortic aneurysm with initial plans 

to have venous Doppler studies conducted in this facility later today.  

Secondary to patient's hypotension differential diagnosis of AAA as above.  D-

dimer stable to somewhat improved   





(10) Diabetes mellitus


SNOMED Code(s): 62550663


   ICD Code: E11.9 - TYPE 2 DIABETES MELLITUS WITHOUT COMPLICATIONS   Status: 

Chronic   Priority: Medium   Current Visit: Yes   Problem Details: Accu-Chek 

stable during this hospitalization with sliding scale in effect.  His metformin 

has been held secondary to IV contrast for CTA of the chest as above.  Home Accu

-Cheks have previously been stable by patient history.  Glycosylated hemoglobin 

excellent on admission.   


Qualifiers: 


   Diabetes mellitus type: type 2   Diabetes mellitus complication status: with 

kidney complications   Diabetes mellitus complication detail: with 

microalbuminuria   Diabetes mellitus long term insulin use: without long term 

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 

kidney complication; R80.9 - Proteinuria, unspecified   





(11) Hypomagnesemia


SNOMED Code(s): 615419526


   ICD Code: E83.42 - HYPOMAGNESEMIA   Status: Acute   Priority: Medium   

Current Visit: Yes   Onset Date: 03/13/17   Problem Details: Initiated 

magnesium oxide therapy yesterday   





(12) Lactic acid blood increased


SNOMED Code(s): 7341657


   ICD Code: R79.89 - OTHER SPECIFIED ABNORMAL FINDINGS OF BLOOD CHEMISTRY   

Status: Acute   Priority: High   Current Visit: Yes   Onset Date: 03/13/17   

Problem Details: Lactic acid continues to improve with sepsis from aspiration 

pneumonia still a differential diagnosis. Observe his borderline confusion 

closely with neuro checks with vitals and relatively stable exam today   





(13) Nausea & vomiting


SNOMED Code(s): 16495464


   ICD Code: R11.2 - NAUSEA WITH VOMITING, UNSPECIFIED   Status: Acute   

Priority: High   Current Visit: Yes   Onset Date: 03/13/17   Problem Details: 

No recurrence of patient's nausea or emesis since after admission. Acute 

abdominal x-rays this morning with results as below. Abdominal assessments were 

conducted with vitals.  Symptoms resolved with 2 doses of IV Zofran in the 

emergency room. NPO status currently with patient tolerating small bland diet 

late yesterday evening.  H. pylori, amylase, and lipase are normal. High dose 

IV Protonix and IV Pepcid already initiated as GI prophylaxis.     


Qualifiers: 


   Vomiting type: unspecified   Vomiting Intractability: non-intractable   

Qualified Code(s): R11.2 - Nausea with vomiting, unspecified   





(14) Osteoarthritis


SNOMED Code(s): 565017797


   ICD Code: M19.90 - UNSPECIFIED OSTEOARTHRITIS, UNSPECIFIED SITE   Status: 

Chronic   Priority: Medium   Current Visit: Yes   Problem Details: Stable by 

history.  History of hyperuricemia and rheumatoid arthritis   


Qualifiers: 


   Osteoarthritis location: multiple joints   Osteoarthritis type: primary   

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   





(15) Renal insufficiency


SNOMED Code(s): 614205870


   ICD Code: N28.9 - DISORDER OF KIDNEY AND URETER, UNSPECIFIED   Status: Acute

   Priority: High   Current Visit: Yes   Problem Details: Progressive renal 

insufficiency despite aggressive IV fluids as above.  Urine output is low with 

history of diabetic nephropathy and progressive hypotension as above.  Chung 

catheter placed this morning to confirm the low urine output as above.  

Specimen collected for UA  with culture and sensitivity no direct indication of 

UTI at this time.     





(16) First degree AV block


SNOMED Code(s): 856005240


   ICD Code: I44.0 - ATRIOVENTRICULAR BLOCK, FIRST DEGREE   Status: Acute   

Priority: Medium   Current Visit: Yes   Onset Date: 03/14/17   Problem Details: 

New first degree AV block with resolution of previous sinus tachycardia and 

returned borderline incomplete/complete right bundle branch block   





(17) Hypoalbuminemia


SNOMED Code(s): 403762483


   ICD Code: E88.09 - OTH DISORDERS OF PLASMA-PROTEIN METABOLISM, NEC   Status: 

Acute   Priority: Medium   Current Visit: Yes   Onset Date: 03/14/17   Problem 

Details: Consider high-protein Glucerna supplements in the future   





(18) Anemia


SNOMED Code(s): 763411017


   ICD Code: D64.9 - ANEMIA, UNSPECIFIED   Status: Acute   Priority: High   

Current Visit: Yes   Onset Date: ~03/13/17   Problem Details: Mild anemia 

developed yesterday with stable hemoglobin this AM. IV Protonix and IV Pepcid 

therapy as above. No evidence of GI bleed, etc. Possible rehydration effect.    


Qualifiers: 


   Anemia type: unspecified type   Qualified Code(s): D64.9 - Anemia, 

unspecified   





- Patient Summary/Data


Operative Procedure(s) Performed: None


Complications: Severe hypotension as above


Consults: 





As above


Labs Pending at D/C: 





1. Urine culture and sensitivity


2. Blood Cultures x 2


3. Final reports for CTA of the chest, abdominal and CXRs, Venous Doppler 

studies and Abdominal AAA US.


Recommended Follow-up Testing/Procedures: 





 Additional possible cardiac, surgical, infectious disease, etc. consultations 

as above


Planned Operative Procedure(s) after DC: NONE


Hospital Course: 


The patient was admitted to Observation on telemetry for further observation 

and treatment of his initial sinus tachycardia, CHF, and nausea/emesis. 

Negative workup for acute MI as above, however subsequent development of severe 

hypotension as above. Further treatment as above Secondary to degrading patient 

condition ICU admission and transfer needed. No sequellae from delayed hospital 

transfer secondary to lack of bed availability as above. 





- Patient Instructions


Diet: NPO


Activity: Bedrest


Driving: Do Not Drive


Showering/Bathing: No Showering


Notify Provider of: Increased Pain, Nausea and/or Vomiting





- Discharge Plan


Home Medications: 


 Home Meds





Aspirin 325 mg PO DAILY 03/13/17 [History]


Carvedilol 12.5 mg PO BID 03/13/17 [History]


Cholecalciferol (Vitamin D3) [Vitamin D3] 1,000 units PO DAILY 03/13/17 [History

]


Cyanocobalamin (Vitamin B-12) [Vitamin B-12] 1,000 mcg PO DAILY 03/13/17 [

History]


Ginkgo Biloba 60 mg PO DAILY 03/13/17 [History]


Ketorolac Tromethamine [Acular] 1 drop EYELF QID 03/13/17 [History]


Latanoprost [Xalatan 0.005% Ophth Soln] 1 drop EYEBOTH BEDTIME 03/13/17 [History

]


Lisinopril 5 mg PO DAILY 03/13/17 [History]


Magnesium Oxide/Mag AA Chelate [Magnesium] 300 mg PO DAILY 03/13/17 [History]


Milk Thistle Seed Extract [Milk Thistle] 100 mg PO DAILY 03/13/17 [History]


Moxifloxacin [Vigamox 0.5% Ophth Soln] 1 drop EYELF QID 03/13/17 [History]


Naproxen Sodium [Aleve] 220 mg PO BID PRN 03/13/17 [History]


Ubidecarenone [Co Q-10] 100 mg PO DAILY 03/13/17 [History]


metFORMIN HCl [Metformin HCl] 1,000 mg PO BID 03/13/17 [History]


prednisoLONE Acetate [Pred Forte 1% Ophth Susp] 1 drop EYERT TID 03/13/17 [

History]


prednisoLONE Acetate [Pred Mild] 1 drop EYELF QID 03/13/17 [History]








Forms:  ED Department Discharge, Interfacility Transfer EMTALA


Referrals: 


Alexnadru Flores NP [Primary Care Provider] - 





- Discharge Summary/Plan Comment


DC Time >30 min.: Yes (Coordination of care)


Discharge Summary/Plan Comment: 





Ambulance transfer with paramedic accompaniment. Extensive precautions were 

given to the patient and his wife, who are in agreement with the treatment plan.





- General Info


Date of Service: 03/14/17


Admission Dx/Problem (Free Text: 





1.  Sinus tachycardia


2.  Mild CHF


3.  Nausea/emesis


Functional Status: Reports: pain controlled, tolerating diet (Yesterday evening 

although n.p.o. currently), urinating (However poor urine output with Chung 

catheter placed early this morning), new symptoms (Severe hypertension), 

incentive spirometry


Numeric/FACES Score: 0





- Review of Systems


General: Reports: Weakness.  Denies: Fever (Fever yesterday evening however 

resolved at this time), Fatigue, Malaise, Chills, Night Sweats, Appetite (

Appetite good yesterday)


HEENT: Reports: no symptoms.  Denies: dysphasia, ear pain, eye pain, sinus 

congestion, sore throat, rhinitis, visual changes


Pulmonary: Reports: cough.  Denies: shortness of breath, pleuritic chest pain, 

sputum, hemoptysis, wheezing


Cardiovascular: Reports: PND, Edema (As below), Lightheadedness.  Denies: Chest 

Pain, Palpitations, Dyspnea on Exertion, Orthopnea


Gastrointestinal: Denies: Abdominal pain, Constipation, Decreased appetite, 

Difficulty swallowing, Flatus, Hematochezia, Melena, Nausea, Vomiting


Genitourinary: Reports: other (Low urine output).  Denies: dysuria, frequency, 

burning, pain, urgency, incontinence, hematuria, retention, flank pain


Musculoskeletal: Reports: no symptoms.  Denies: neck pain, shoulder pain, arm 

pain, leg pain


Skin: Reports: no symptoms.  Denies: cyanosis, jaundice, mottled, pallor, 

diaphoresis, bruising, pruritis, rash


Neurological: Reports: Confusion (Borderline), Weakness.  Denies: Dizziness, 

Headache, Numbness, Paresthesia, Tingling, Difficulty Walking


Psychiatric: Reports: confusion.  Denies: depression, anxiety, agitation, 

hallucinations





- Patient Data


Vitals - Most Recent: 


 Last Vital Signs











Temp  36.5 C   03/14/17 04:57


 


Pulse  88   03/14/17 04:57


 


Resp  20   03/14/17 04:57


 


BP  70/50 L  03/14/17 04:57


 


Pulse Ox  96   03/14/17 04:57








 Vital Signs - 24 hr











  03/13/17 03/13/17 03/13/17





  14:35 14:50 15:22


 


Temperature [   





Axillary]   


 


Temperature [   





Oral]   


 


Pulse,   





Peripheral [   





Left Pulse   





Oximetry]   


 


Pulse, 112 H  107 H





Peripheral [   





Right Brachial]   


 


Respiratory 23 H 22 H 26 H





Rate   


 


Blood Pressure 92/62 90/60 98/62





[Left Upper Arm   





]   


 


Blood Pressure   





[Right Upper   





Arm]   


 


O2 Sat by Pulse 92 L 90 L 98





Oximetry   














  03/13/17 03/13/17 03/13/17





  16:43 17:45 19:51


 


Temperature [  37.0 C 38.2 C H





Axillary]   


 


Temperature [   





Oral]   


 


Pulse,   





Peripheral [   





Left Pulse   





Oximetry]   


 


Pulse,  113 H 101 H





Peripheral [   





Right Brachial]   


 


Respiratory  20 26 H





Rate   


 


Blood Pressure  115/62 94/56 L





[Left Upper Arm   





]   


 


Blood Pressure   





[Right Upper   





Arm]   


 


O2 Sat by Pulse 98 98 98





Oximetry   














  03/13/17 03/13/17 03/14/17





  21:35 23:55 00:12


 


Temperature [   





Axillary]   


 


Temperature [ 36.6 C 36.6 C 





Oral]   


 


Pulse,  91 





Peripheral [   





Left Pulse   





Oximetry]   


 


Pulse, 97  





Peripheral [   





Right Brachial]   


 


Respiratory 24 H 18 





Rate   


 


Blood Pressure 100/66 75/42 L 70/50 L





[Left Upper Arm   





]   


 


Blood Pressure   





[Right Upper   





Arm]   


 


O2 Sat by Pulse 99 96 





Oximetry   














  03/14/17 03/14/17 03/14/17





  01:44 03:00 04:00


 


Temperature [   





Axillary]   


 


Temperature [  36.4 C 





Oral]   


 


Pulse, 77 84 80





Peripheral [   





Left Pulse   





Oximetry]   


 


Pulse,   





Peripheral [   





Right Brachial]   


 


Respiratory 19 20 19





Rate   


 


Blood Pressure 80/60 L 80/60 L 80/60 L





[Left Upper Arm   





]   


 


Blood Pressure   





[Right Upper   





Arm]   


 


O2 Sat by Pulse 96 96 95





Oximetry   














  03/14/17 03/14/17 03/14/17





  04:57 06:00 07:00


 


Temperature [ 36.5 C  





Axillary]   


 


Temperature [  36.7 C 36.6 C





Oral]   


 


Pulse, 88 99 81





Peripheral [   





Left Pulse   





Oximetry]   


 


Pulse,   





Peripheral [   





Right Brachial]   


 


Respiratory 20 20 18





Rate   


 


Blood Pressure 70/50 L 80/60 L 85/60 L





[Left Upper Arm   





]   


 


Blood Pressure   





[Right Upper   





Arm]   


 


O2 Sat by Pulse 96 96 94 L





Oximetry   














  03/14/17 03/14/17 03/14/17





  08:00 09:00 10:00


 


Temperature [   





Axillary]   


 


Temperature [  36.4 C 36.6 C





Oral]   


 


Pulse,  83 82





Peripheral [   





Left Pulse   





Oximetry]   


 


Pulse,   





Peripheral [   





Right Brachial]   


 


Respiratory  18 18





Rate   


 


Blood Pressure 90/60 100/65 





[Left Upper Arm   





]   


 


Blood Pressure   82/58 L





[Right Upper   





Arm]   


 


O2 Sat by Pulse  92 L 94 L





Oximetry   














  03/14/17 03/14/17 03/14/17





  10:19 12:19 13:00


 


Temperature [   





Axillary]   


 


Temperature [   37.1 C





Oral]   


 


Pulse,  87 84





Peripheral [   





Left Pulse   





Oximetry]   


 


Pulse,   





Peripheral [   





Right Brachial]   


 


Respiratory 18 18 18





Rate   


 


Blood Pressure 82/58 L 90/58 L 86/64 L





[Left Upper Arm   





]   


 


Blood Pressure   





[Right Upper   





Arm]   


 


O2 Sat by Pulse 96 96 92 L





Oximetry   











Weight - Most Recent: 86.636 kg


I&O - Last 24 hours: 


 Intake & Output











 03/13/17 03/13/17 03/14/17





 14:59 22:59 06:59


 


Intake Total  100 1850


 


Balance  100 1850











Imaging Impressions - Last 24 hrs: 





Cardiac monitor shows resolution of sinus tachycardia prior to discharge with 

previous severe sinus tachycardia significantly improved shortly after 

treatment in the emergency room.  No ectopy or arrhythmia.





Acute abdominal x-rays this morning shows evidence of moderate COPD changes and 

probable right middle lobe pulmonary infiltrates with pulmonary hypertension 

and mild centralized CHF.  No pneumothorax noted.  Moderate diffuse nonspecific 

increased bowel gaseous pattern with no free air, significant fluid levels, 

ileus, or obstruction.  No direct evidence of AAA.  Moderate osteoarthritic 

changes also present





Telephone consultation at 09:15 AM with US tech. Negative preliminary verbal 

report for AAA and DVT in Limited Abd. US and Venous Doppler studies of the 

lower extemities this AM.  





Evaluations in the emergency room on 3/13/17 as follows:





Cardiac monitor showed heart rate in the 100s to 110s after treatment in the 

emergency room as above with initial cardiac monitoring by means of EKG machine 

with heart rates in the 130s to 160s showing sinus tachycardia, however no 

other ectopy or arrhythmia





Chest x-ray, portable, shows evidence of moderate COPD changes with probable 

pulmonary hypertension and mild centralized CHF with mild prominence of the 

proximal aortic arch, however no cardiomegaly, pulmonary infiltrates, 

pneumothorax, etc.





Abdominal x-rays, flat, shows moderate bowel gaseous distention with no free air

, ileus, or obstruction with phleboliths noted in the pelvic region.  Possible 

left renal nephrolithiasis





Telephone consultation at 15:36 hours with the radiology department at [facility] in [place] with negative CTA of the chest for PE, etc.





Lab Results - Last 24 hrs: 


 Laboratory Results - last 24 hr











  03/13/17 03/13/17 03/13/17 Range/Units





  18:51 20:40 20:40 


 


WBC     (4.0-10.2)  K/uL


 


RBC     (4.33-5.41)  M/uL


 


Hgb     (13.1-16.8)  g/dL


 


Hct     (39.0-49.0)  %


 


MCV     (84.0-98.0)  fL


 


MCH     (28.2-33.3)  pg


 


MCHC     (31.7-36.0)  g/dL


 


RDW     (11.2-14.1)  %


 


Plt Count     (150-350)  K/uL


 


Neut % (Auto)     (45.0-80.0)  %


 


Lymph % (Auto)     (10.0-50.0)  %


 


Mono % (Auto)     (2.0-14.0)  %


 


Eos % (Auto)     (0.0-5.0)  %


 


Baso % (Auto)     (0.0-2.0)  %


 


Neut #     (1.40-7.00)  K/uL


 


Lymph #     (0.50-3.50)  K/uL


 


Mono #     (0.00-1.00)  K/uL


 


Eos #     (0.00-0.50)  K/uL


 


Baso #     (0.00-0.20)  K/uL


 


Sodium     (136-145)  mmol/L


 


Potassium     (3.5-5.1)  mmol/L


 


Chloride     ()  mmol/L


 


Carbon Dioxide     (21.0-32.0)  mmol/L


 


BUN     (7-18)  mg/dL


 


Creatinine     (0.51-1.17)  mg/dL


 


Est Cr Clr Drug Dosing     mL/min


 


Estimated GFR (MDRD)     mL/min


 


Glucose     ()  mg/dL


 


POC Glucose  175 H    ()  mg/dl


 


Lactic Acid    3.2 H  (0.4-2.0)  mmol/L


 


Calcium     (8.5-10.1)  mg/dL


 


Creatine Kinase   162   ()  U/L


 


Creatine Kinase Index   0.8   (0.0-2.5)  %


 


CK-MB (CK-2)   1.30   (0.00-3.60)  ng/mL


 


Troponin I   0.019   (0.000-0.056)  ng/mL














  03/13/17 03/14/17 03/14/17 Range/Units





  23:02 00:42 00:42 


 


WBC   19.0 H   (4.0-10.2)  K/uL


 


RBC   4.31 L   (4.33-5.41)  M/uL


 


Hgb   12.9 L D   (13.1-16.8)  g/dL


 


Hct   38.6 L   (39.0-49.0)  %


 


MCV   89.6   (84.0-98.0)  fL


 


MCH   29.9   (28.2-33.3)  pg


 


MCHC   33.4   (31.7-36.0)  g/dL


 


RDW   13.0   (11.2-14.1)  %


 


Plt Count   180   (150-350)  K/uL


 


Neut % (Auto)   92.8 H   (45.0-80.0)  %


 


Lymph % (Auto)   2.3 L   (10.0-50.0)  %


 


Mono % (Auto)   4.7   (2.0-14.0)  %


 


Eos % (Auto)   0.1   (0.0-5.0)  %


 


Baso % (Auto)   0.1   (0.0-2.0)  %


 


Neut #   17.61 H   (1.40-7.00)  K/uL


 


Lymph #   0.44 L   (0.50-3.50)  K/uL


 


Mono #   0.89   (0.00-1.00)  K/uL


 


Eos #   0.02   (0.00-0.50)  K/uL


 


Baso #   0.02   (0.00-0.20)  K/uL


 


Sodium    137  (136-145)  mmol/L


 


Potassium    4.9  (3.5-5.1)  mmol/L


 


Chloride    104  ()  mmol/L


 


Carbon Dioxide    23.8  (21.0-32.0)  mmol/L


 


BUN    32 H  (7-18)  mg/dL


 


Creatinine    1.99 H  (0.51-1.17)  mg/dL


 


Est Cr Clr Drug Dosing    31.41  mL/min


 


Estimated GFR (MDRD)    32  mL/min


 


Glucose    223 H  ()  mg/dL


 


POC Glucose  234 H    ()  mg/dl


 


Lactic Acid     (0.4-2.0)  mmol/L


 


Calcium    8.3 L  (8.5-10.1)  mg/dL


 


Creatine Kinase     ()  U/L


 


Creatine Kinase Index     (0.0-2.5)  %


 


CK-MB (CK-2)     (0.00-3.60)  ng/mL


 


Troponin I     (0.000-0.056)  ng/mL














  03/14/17 Range/Units





  00:42 


 


WBC   (4.0-10.2)  K/uL


 


RBC   (4.33-5.41)  M/uL


 


Hgb   (13.1-16.8)  g/dL


 


Hct   (39.0-49.0)  %


 


MCV   (84.0-98.0)  fL


 


MCH   (28.2-33.3)  pg


 


MCHC   (31.7-36.0)  g/dL


 


RDW   (11.2-14.1)  %


 


Plt Count   (150-350)  K/uL


 


Neut % (Auto)   (45.0-80.0)  %


 


Lymph % (Auto)   (10.0-50.0)  %


 


Mono % (Auto)   (2.0-14.0)  %


 


Eos % (Auto)   (0.0-5.0)  %


 


Baso % (Auto)   (0.0-2.0)  %


 


Neut #   (1.40-7.00)  K/uL


 


Lymph #   (0.50-3.50)  K/uL


 


Mono #   (0.00-1.00)  K/uL


 


Eos #   (0.00-0.50)  K/uL


 


Baso #   (0.00-0.20)  K/uL


 


Sodium   (136-145)  mmol/L


 


Potassium   (3.5-5.1)  mmol/L


 


Chloride   ()  mmol/L


 


Carbon Dioxide   (21.0-32.0)  mmol/L


 


BUN   (7-18)  mg/dL


 


Creatinine   (0.51-1.17)  mg/dL


 


Est Cr Clr Drug Dosing   mL/min


 


Estimated GFR (MDRD)   mL/min


 


Glucose   ()  mg/dL


 


POC Glucose   ()  mg/dl


 


Lactic Acid   (0.4-2.0)  mmol/L


 


Calcium   (8.5-10.1)  mg/dL


 


Creatine Kinase  159  ()  U/L


 


Creatine Kinase Index  0.6  (0.0-2.5)  %


 


CK-MB (CK-2)  0.90  (0.00-3.60)  ng/mL


 


Troponin I  0.016  (0.000-0.056)  ng/mL








 Laboratory Tests











  03/13/17 03/13/17 03/13/17 Range/Units





  14:00 14:00 14:00 


 


WBC  5.3    (4.0-10.2)  K/uL


 


RBC  5.40    (4.33-5.41)  M/uL


 


Hgb  16.2  D    (13.1-16.8)  g/dL


 


Hct  48.9    (39.0-49.0)  %


 


MCV  90.6    (84.0-98.0)  fL


 


MCH  30.0    (28.2-33.3)  pg


 


MCHC  33.1    (31.7-36.0)  g/dL


 


RDW  12.9    (11.2-14.1)  %


 


Plt Count  210    (150-350)  K/uL


 


Neut % (Auto)  89.2 H    (45.0-80.0)  %


 


Lymph % (Auto)  10.2    (10.0-50.0)  %


 


Mono % (Auto)  0.2 L    (2.0-14.0)  %


 


Eos % (Auto)  0.2    (0.0-5.0)  %


 


Baso % (Auto)  0.2    (0.0-2.0)  %


 


Neut #  4.73    (1.40-7.00)  K/uL


 


Lymph #  0.54    (0.50-3.50)  K/uL


 


Mono #  0.01    (0.00-1.00)  K/uL


 


Eos #  0.01    (0.00-0.50)  K/uL


 


Baso #  0.01    (0.00-0.20)  K/uL


 


PT   12.2 H   (9.8-11.7)  SEC


 


INR   1.1   


 


APTT   23.6   (23.5-30.0)  SEC


 


D-Dimer, Quantitative    3790 H  (0-400)  ng/mL


 


Sodium     (136-145)  mmol/L


 


Potassium     (3.5-5.1)  mmol/L


 


Chloride     ()  mmol/L


 


Carbon Dioxide     (21.0-32.0)  mmol/L


 


BUN     (7-18)  mg/dL


 


Creatinine     (0.51-1.17)  mg/dL


 


Est Cr Clr Drug Dosing     mL/min


 


Estimated GFR (MDRD)     mL/min


 


Glucose     ()  mg/dL


 


POC Glucose     ()  mg/dl


 


Hemoglobin A1c     (4.3-5.7)  %


 


Lactic Acid     (0.4-2.0)  mmol/L


 


Uric Acid     (2.6-7.2)  mg/dL


 


Calcium     (8.5-10.1)  mg/dL


 


Magnesium     (1.8-2.4)  mg/dL


 


Total Bilirubin     (0.2-1.0)  mg/dL


 


AST     (15-37)  U/L


 


ALT     (12-78)  U/L


 


Alkaline Phosphatase     ()  IU/L


 


Creatine Kinase     ()  U/L


 


Creatine Kinase Index     (0.0-2.5)  %


 


CK-MB (CK-2)     (0.00-3.60)  ng/mL


 


Troponin I     (0.000-0.056)  ng/mL


 


Pro-B-Natriuretic Pept     (0-125)  pg/mL


 


Total Protein     (6.4-8.2)  g/dL


 


Albumin     (3.4-5.0)  g/dL


 


Triglycerides     ()  mg/dL


 


Cholesterol     (100-200)  mg/dL


 


LDL Cholesterol, Calc     (0-100)  mg/dL


 


HDL Cholesterol     (40-60)  mg/dL


 


Amylase     ()  U/L


 


Lipase     ()  U/L


 


TSH, Ultra Sensitive     (0.358-3.740)  mIU/mL


 


Specimen Type     


 


Urine Color     


 


Urine Appearance     


 


Urine pH     (5.0-9.0)  


 


Ur Specific Gravity     (1.005-1.030)  


 


Urine Protein     (NEGATIVE)  mg/dL


 


Urine Glucose (UA)     (NEGATIVE)  mg/dL


 


Urine Ketones     (NEGATIVE)  mg/dL


 


Urine Occult Blood     (NEGATIVE)  


 


Urine Nitrite     (NEGATIVE)  


 


Urine Bilirubin     (NEGATIVE)  


 


Urine Urobilinogen     (0.2-1.0)  E.U./dL


 


Ur Leukocyte Esterase     (NEGATIVE)  


 


Urine RBC     /HPF


 


Urine WBC     /HPF


 


Ur Epithelial Cells     /LPF


 


Urine Bacteria     (NONE TO FEW)  /HPF


 


H. pylori IgG Antibody     (NEGATIVE)  














  03/13/17 03/13/17 03/13/17 Range/Units





  14:00 14:00 14:00 


 


WBC     (4.0-10.2)  K/uL


 


RBC     (4.33-5.41)  M/uL


 


Hgb     (13.1-16.8)  g/dL


 


Hct     (39.0-49.0)  %


 


MCV     (84.0-98.0)  fL


 


MCH     (28.2-33.3)  pg


 


MCHC     (31.7-36.0)  g/dL


 


RDW     (11.2-14.1)  %


 


Plt Count     (150-350)  K/uL


 


Neut % (Auto)     (45.0-80.0)  %


 


Lymph % (Auto)     (10.0-50.0)  %


 


Mono % (Auto)     (2.0-14.0)  %


 


Eos % (Auto)     (0.0-5.0)  %


 


Baso % (Auto)     (0.0-2.0)  %


 


Neut #     (1.40-7.00)  K/uL


 


Lymph #     (0.50-3.50)  K/uL


 


Mono #     (0.00-1.00)  K/uL


 


Eos #     (0.00-0.50)  K/uL


 


Baso #     (0.00-0.20)  K/uL


 


PT     (9.8-11.7)  SEC


 


INR     


 


APTT     (23.5-30.0)  SEC


 


D-Dimer, Quantitative     (0-400)  ng/mL


 


Sodium  141    (136-145)  mmol/L


 


Potassium  4.7    (3.5-5.1)  mmol/L


 


Chloride  104    ()  mmol/L


 


Carbon Dioxide  23.0    (21.0-32.0)  mmol/L


 


BUN  20 H    (7-18)  mg/dL


 


Creatinine  1.22 H    (0.51-1.17)  mg/dL


 


Est Cr Clr Drug Dosing  51.24    mL/min


 


Estimated GFR (MDRD)  57    mL/min


 


Glucose  226 H    ()  mg/dL


 


POC Glucose     ()  mg/dl


 


Hemoglobin A1c     (4.3-5.7)  %


 


Lactic Acid   7.7 H   (0.4-2.0)  mmol/L


 


Uric Acid  7.0    (2.6-7.2)  mg/dL


 


Calcium  9.3    (8.5-10.1)  mg/dL


 


Magnesium  1.4 L    (1.8-2.4)  mg/dL


 


Total Bilirubin  0.7    (0.2-1.0)  mg/dL


 


AST  23    (15-37)  U/L


 


ALT  26    (12-78)  U/L


 


Alkaline Phosphatase  75    ()  IU/L


 


Creatine Kinase  114    ()  U/L


 


Creatine Kinase Index  1.4    (0.0-2.5)  %


 


CK-MB (CK-2)  1.60    (0.00-3.60)  ng/mL


 


Troponin I  0.008    (0.000-0.056)  ng/mL


 


Pro-B-Natriuretic Pept  364 H    (0-125)  pg/mL


 


Total Protein  7.1    (6.4-8.2)  g/dL


 


Albumin  3.6    (3.4-5.0)  g/dL


 


Triglycerides     ()  mg/dL


 


Cholesterol     (100-200)  mg/dL


 


LDL Cholesterol, Calc     (0-100)  mg/dL


 


HDL Cholesterol     (40-60)  mg/dL


 


Amylase  54    ()  U/L


 


Lipase  153    ()  U/L


 


TSH, Ultra Sensitive  2.569    (0.358-3.740)  mIU/mL


 


Specimen Type     


 


Urine Color     


 


Urine Appearance     


 


Urine pH     (5.0-9.0)  


 


Ur Specific Gravity     (1.005-1.030)  


 


Urine Protein     (NEGATIVE)  mg/dL


 


Urine Glucose (UA)     (NEGATIVE)  mg/dL


 


Urine Ketones     (NEGATIVE)  mg/dL


 


Urine Occult Blood     (NEGATIVE)  


 


Urine Nitrite     (NEGATIVE)  


 


Urine Bilirubin     (NEGATIVE)  


 


Urine Urobilinogen     (0.2-1.0)  E.U./dL


 


Ur Leukocyte Esterase     (NEGATIVE)  


 


Urine RBC     /HPF


 


Urine WBC     /HPF


 


Ur Epithelial Cells     /LPF


 


Urine Bacteria     (NONE TO FEW)  /HPF


 


H. pylori IgG Antibody    Negative  (NEGATIVE)  














  03/13/17 03/13/17 03/13/17 Range/Units





  14:00 18:51 20:40 


 


WBC     (4.0-10.2)  K/uL


 


RBC     (4.33-5.41)  M/uL


 


Hgb     (13.1-16.8)  g/dL


 


Hct     (39.0-49.0)  %


 


MCV     (84.0-98.0)  fL


 


MCH     (28.2-33.3)  pg


 


MCHC     (31.7-36.0)  g/dL


 


RDW     (11.2-14.1)  %


 


Plt Count     (150-350)  K/uL


 


Neut % (Auto)     (45.0-80.0)  %


 


Lymph % (Auto)     (10.0-50.0)  %


 


Mono % (Auto)     (2.0-14.0)  %


 


Eos % (Auto)     (0.0-5.0)  %


 


Baso % (Auto)     (0.0-2.0)  %


 


Neut #     (1.40-7.00)  K/uL


 


Lymph #     (0.50-3.50)  K/uL


 


Mono #     (0.00-1.00)  K/uL


 


Eos #     (0.00-0.50)  K/uL


 


Baso #     (0.00-0.20)  K/uL


 


PT     (9.8-11.7)  SEC


 


INR     


 


APTT     (23.5-30.0)  SEC


 


D-Dimer, Quantitative     (0-400)  ng/mL


 


Sodium     (136-145)  mmol/L


 


Potassium     (3.5-5.1)  mmol/L


 


Chloride     ()  mmol/L


 


Carbon Dioxide     (21.0-32.0)  mmol/L


 


BUN     (7-18)  mg/dL


 


Creatinine     (0.51-1.17)  mg/dL


 


Est Cr Clr Drug Dosing     mL/min


 


Estimated GFR (MDRD)     mL/min


 


Glucose     ()  mg/dL


 


POC Glucose   175 H   ()  mg/dl


 


Hemoglobin A1c  5.9 H    (4.3-5.7)  %


 


Lactic Acid     (0.4-2.0)  mmol/L


 


Uric Acid     (2.6-7.2)  mg/dL


 


Calcium     (8.5-10.1)  mg/dL


 


Magnesium     (1.8-2.4)  mg/dL


 


Total Bilirubin     (0.2-1.0)  mg/dL


 


AST     (15-37)  U/L


 


ALT     (12-78)  U/L


 


Alkaline Phosphatase     ()  IU/L


 


Creatine Kinase    162  ()  U/L


 


Creatine Kinase Index    0.8  (0.0-2.5)  %


 


CK-MB (CK-2)    1.30  (0.00-3.60)  ng/mL


 


Troponin I    0.019  (0.000-0.056)  ng/mL


 


Pro-B-Natriuretic Pept     (0-125)  pg/mL


 


Total Protein     (6.4-8.2)  g/dL


 


Albumin     (3.4-5.0)  g/dL


 


Triglycerides     ()  mg/dL


 


Cholesterol     (100-200)  mg/dL


 


LDL Cholesterol, Calc     (0-100)  mg/dL


 


HDL Cholesterol     (40-60)  mg/dL


 


Amylase     ()  U/L


 


Lipase     ()  U/L


 


TSH, Ultra Sensitive     (0.358-3.740)  mIU/mL


 


Specimen Type     


 


Urine Color     


 


Urine Appearance     


 


Urine pH     (5.0-9.0)  


 


Ur Specific Gravity     (1.005-1.030)  


 


Urine Protein     (NEGATIVE)  mg/dL


 


Urine Glucose (UA)     (NEGATIVE)  mg/dL


 


Urine Ketones     (NEGATIVE)  mg/dL


 


Urine Occult Blood     (NEGATIVE)  


 


Urine Nitrite     (NEGATIVE)  


 


Urine Bilirubin     (NEGATIVE)  


 


Urine Urobilinogen     (0.2-1.0)  E.U./dL


 


Ur Leukocyte Esterase     (NEGATIVE)  


 


Urine RBC     /HPF


 


Urine WBC     /HPF


 


Ur Epithelial Cells     /LPF


 


Urine Bacteria     (NONE TO FEW)  /HPF


 


H. pylori IgG Antibody     (NEGATIVE)  














  03/13/17 03/13/17 03/14/17 Range/Units





  20:40 23:02 00:42 


 


WBC    19.0 H  (4.0-10.2)  K/uL


 


RBC    4.31 L  (4.33-5.41)  M/uL


 


Hgb    12.9 L D  (13.1-16.8)  g/dL


 


Hct    38.6 L  (39.0-49.0)  %


 


MCV    89.6  (84.0-98.0)  fL


 


MCH    29.9  (28.2-33.3)  pg


 


MCHC    33.4  (31.7-36.0)  g/dL


 


RDW    13.0  (11.2-14.1)  %


 


Plt Count    180  (150-350)  K/uL


 


Neut % (Auto)    92.8 H  (45.0-80.0)  %


 


Lymph % (Auto)    2.3 L  (10.0-50.0)  %


 


Mono % (Auto)    4.7  (2.0-14.0)  %


 


Eos % (Auto)    0.1  (0.0-5.0)  %


 


Baso % (Auto)    0.1  (0.0-2.0)  %


 


Neut #    17.61 H  (1.40-7.00)  K/uL


 


Lymph #    0.44 L  (0.50-3.50)  K/uL


 


Mono #    0.89  (0.00-1.00)  K/uL


 


Eos #    0.02  (0.00-0.50)  K/uL


 


Baso #    0.02  (0.00-0.20)  K/uL


 


PT     (9.8-11.7)  SEC


 


INR     


 


APTT     (23.5-30.0)  SEC


 


D-Dimer, Quantitative     (0-400)  ng/mL


 


Sodium     (136-145)  mmol/L


 


Potassium     (3.5-5.1)  mmol/L


 


Chloride     ()  mmol/L


 


Carbon Dioxide     (21.0-32.0)  mmol/L


 


BUN     (7-18)  mg/dL


 


Creatinine     (0.51-1.17)  mg/dL


 


Est Cr Clr Drug Dosing     mL/min


 


Estimated GFR (MDRD)     mL/min


 


Glucose     ()  mg/dL


 


POC Glucose   234 H   ()  mg/dl


 


Hemoglobin A1c     (4.3-5.7)  %


 


Lactic Acid  3.2 H    (0.4-2.0)  mmol/L


 


Uric Acid     (2.6-7.2)  mg/dL


 


Calcium     (8.5-10.1)  mg/dL


 


Magnesium     (1.8-2.4)  mg/dL


 


Total Bilirubin     (0.2-1.0)  mg/dL


 


AST     (15-37)  U/L


 


ALT     (12-78)  U/L


 


Alkaline Phosphatase     ()  IU/L


 


Creatine Kinase     ()  U/L


 


Creatine Kinase Index     (0.0-2.5)  %


 


CK-MB (CK-2)     (0.00-3.60)  ng/mL


 


Troponin I     (0.000-0.056)  ng/mL


 


Pro-B-Natriuretic Pept     (0-125)  pg/mL


 


Total Protein     (6.4-8.2)  g/dL


 


Albumin     (3.4-5.0)  g/dL


 


Triglycerides     ()  mg/dL


 


Cholesterol     (100-200)  mg/dL


 


LDL Cholesterol, Calc     (0-100)  mg/dL


 


HDL Cholesterol     (40-60)  mg/dL


 


Amylase     ()  U/L


 


Lipase     ()  U/L


 


TSH, Ultra Sensitive     (0.358-3.740)  mIU/mL


 


Specimen Type     


 


Urine Color     


 


Urine Appearance     


 


Urine pH     (5.0-9.0)  


 


Ur Specific Gravity     (1.005-1.030)  


 


Urine Protein     (NEGATIVE)  mg/dL


 


Urine Glucose (UA)     (NEGATIVE)  mg/dL


 


Urine Ketones     (NEGATIVE)  mg/dL


 


Urine Occult Blood     (NEGATIVE)  


 


Urine Nitrite     (NEGATIVE)  


 


Urine Bilirubin     (NEGATIVE)  


 


Urine Urobilinogen     (0.2-1.0)  E.U./dL


 


Ur Leukocyte Esterase     (NEGATIVE)  


 


Urine RBC     /HPF


 


Urine WBC     /HPF


 


Ur Epithelial Cells     /LPF


 


Urine Bacteria     (NONE TO FEW)  /HPF


 


H. pylori IgG Antibody     (NEGATIVE)  














  03/14/17 03/14/17 03/14/17 Range/Units





  00:42 00:42 04:59 


 


WBC     (4.0-10.2)  K/uL


 


RBC     (4.33-5.41)  M/uL


 


Hgb     (13.1-16.8)  g/dL


 


Hct     (39.0-49.0)  %


 


MCV     (84.0-98.0)  fL


 


MCH     (28.2-33.3)  pg


 


MCHC     (31.7-36.0)  g/dL


 


RDW     (11.2-14.1)  %


 


Plt Count     (150-350)  K/uL


 


Neut % (Auto)     (45.0-80.0)  %


 


Lymph % (Auto)     (10.0-50.0)  %


 


Mono % (Auto)     (2.0-14.0)  %


 


Eos % (Auto)     (0.0-5.0)  %


 


Baso % (Auto)     (0.0-2.0)  %


 


Neut #     (1.40-7.00)  K/uL


 


Lymph #     (0.50-3.50)  K/uL


 


Mono #     (0.00-1.00)  K/uL


 


Eos #     (0.00-0.50)  K/uL


 


Baso #     (0.00-0.20)  K/uL


 


PT     (9.8-11.7)  SEC


 


INR     


 


APTT     (23.5-30.0)  SEC


 


D-Dimer, Quantitative     (0-400)  ng/mL


 


Sodium  137    (136-145)  mmol/L


 


Potassium  4.9    (3.5-5.1)  mmol/L


 


Chloride  104    ()  mmol/L


 


Carbon Dioxide  23.8    (21.0-32.0)  mmol/L


 


BUN  32 H    (7-18)  mg/dL


 


Creatinine  1.99 H    (0.51-1.17)  mg/dL


 


Est Cr Clr Drug Dosing  31.41    mL/min


 


Estimated GFR (MDRD)  32    mL/min


 


Glucose  223 H    ()  mg/dL


 


POC Glucose    170 H  ()  mg/dl


 


Hemoglobin A1c     (4.3-5.7)  %


 


Lactic Acid     (0.4-2.0)  mmol/L


 


Uric Acid     (2.6-7.2)  mg/dL


 


Calcium  8.3 L    (8.5-10.1)  mg/dL


 


Magnesium     (1.8-2.4)  mg/dL


 


Total Bilirubin     (0.2-1.0)  mg/dL


 


AST     (15-37)  U/L


 


ALT     (12-78)  U/L


 


Alkaline Phosphatase     ()  IU/L


 


Creatine Kinase   159   ()  U/L


 


Creatine Kinase Index   0.6   (0.0-2.5)  %


 


CK-MB (CK-2)   0.90   (0.00-3.60)  ng/mL


 


Troponin I   0.016   (0.000-0.056)  ng/mL


 


Pro-B-Natriuretic Pept     (0-125)  pg/mL


 


Total Protein     (6.4-8.2)  g/dL


 


Albumin     (3.4-5.0)  g/dL


 


Triglycerides     ()  mg/dL


 


Cholesterol     (100-200)  mg/dL


 


LDL Cholesterol, Calc     (0-100)  mg/dL


 


HDL Cholesterol     (40-60)  mg/dL


 


Amylase     ()  U/L


 


Lipase     ()  U/L


 


TSH, Ultra Sensitive     (0.358-3.740)  mIU/mL


 


Specimen Type     


 


Urine Color     


 


Urine Appearance     


 


Urine pH     (5.0-9.0)  


 


Ur Specific Gravity     (1.005-1.030)  


 


Urine Protein     (NEGATIVE)  mg/dL


 


Urine Glucose (UA)     (NEGATIVE)  mg/dL


 


Urine Ketones     (NEGATIVE)  mg/dL


 


Urine Occult Blood     (NEGATIVE)  


 


Urine Nitrite     (NEGATIVE)  


 


Urine Bilirubin     (NEGATIVE)  


 


Urine Urobilinogen     (0.2-1.0)  E.U./dL


 


Ur Leukocyte Esterase     (NEGATIVE)  


 


Urine RBC     /HPF


 


Urine WBC     /HPF


 


Ur Epithelial Cells     /LPF


 


Urine Bacteria     (NONE TO FEW)  /HPF


 


H. pylori IgG Antibody     (NEGATIVE)  














  03/14/17 03/14/17 03/14/17 Range/Units





  05:31 05:35 05:35 


 


WBC     (4.0-10.2)  K/uL


 


RBC     (4.33-5.41)  M/uL


 


Hgb     (13.1-16.8)  g/dL


 


Hct     (39.0-49.0)  %


 


MCV     (84.0-98.0)  fL


 


MCH     (28.2-33.3)  pg


 


MCHC     (31.7-36.0)  g/dL


 


RDW     (11.2-14.1)  %


 


Plt Count     (150-350)  K/uL


 


Neut % (Auto)     (45.0-80.0)  %


 


Lymph % (Auto)     (10.0-50.0)  %


 


Mono % (Auto)     (2.0-14.0)  %


 


Eos % (Auto)     (0.0-5.0)  %


 


Baso % (Auto)     (0.0-2.0)  %


 


Neut #     (1.40-7.00)  K/uL


 


Lymph #     (0.50-3.50)  K/uL


 


Mono #     (0.00-1.00)  K/uL


 


Eos #     (0.00-0.50)  K/uL


 


Baso #     (0.00-0.20)  K/uL


 


PT     (9.8-11.7)  SEC


 


INR     


 


APTT     (23.5-30.0)  SEC


 


D-Dimer, Quantitative    3370 H  (0-400)  ng/mL


 


Sodium   139   (136-145)  mmol/L


 


Potassium   4.5   (3.5-5.1)  mmol/L


 


Chloride   105   ()  mmol/L


 


Carbon Dioxide   25.6   (21.0-32.0)  mmol/L


 


BUN   32 H   (7-18)  mg/dL


 


Creatinine   1.87 H   (0.51-1.17)  mg/dL


 


Est Cr Clr Drug Dosing   33.43   mL/min


 


Estimated GFR (MDRD)   35   mL/min


 


Glucose   179 H   ()  mg/dL


 


POC Glucose     ()  mg/dl


 


Hemoglobin A1c     (4.3-5.7)  %


 


Lactic Acid     (0.4-2.0)  mmol/L


 


Uric Acid     (2.6-7.2)  mg/dL


 


Calcium   8.1 L   (8.5-10.1)  mg/dL


 


Magnesium     (1.8-2.4)  mg/dL


 


Total Bilirubin   0.3   (0.2-1.0)  mg/dL


 


AST   14 L   (15-37)  U/L


 


ALT   18   (12-78)  U/L


 


Alkaline Phosphatase   48   ()  IU/L


 


Creatine Kinase   146   ()  U/L


 


Creatine Kinase Index   0.8   (0.0-2.5)  %


 


CK-MB (CK-2)   1.20   (0.00-3.60)  ng/mL


 


Troponin I   0.013   (0.000-0.056)  ng/mL


 


Pro-B-Natriuretic Pept   888 H   (0-125)  pg/mL


 


Total Protein   5.6 L   (6.4-8.2)  g/dL


 


Albumin   2.7 L   (3.4-5.0)  g/dL


 


Triglycerides   91   ()  mg/dL


 


Cholesterol   124   (100-200)  mg/dL


 


LDL Cholesterol, Calc   76   (0-100)  mg/dL


 


HDL Cholesterol   30 L   (40-60)  mg/dL


 


Amylase   32   ()  U/L


 


Lipase   66 L   ()  U/L


 


TSH, Ultra Sensitive     (0.358-3.740)  mIU/mL


 


Specimen Type  Urinfol    


 


Urine Color  Yellow    


 


Urine Appearance  Clear    


 


Urine pH  5.5    (5.0-9.0)  


 


Ur Specific Gravity  <= 1.005    (1.005-1.030)  


 


Urine Protein  30 H    (NEGATIVE)  mg/dL


 


Urine Glucose (UA)  Negative    (NEGATIVE)  mg/dL


 


Urine Ketones  Trace H    (NEGATIVE)  mg/dL


 


Urine Occult Blood  Trace-intact H    (NEGATIVE)  


 


Urine Nitrite  Negative    (NEGATIVE)  


 


Urine Bilirubin  Small H    (NEGATIVE)  


 


Urine Urobilinogen  1.0    (0.2-1.0)  E.U./dL


 


Ur Leukocyte Esterase  Negative    (NEGATIVE)  


 


Urine RBC  0-5    /HPF


 


Urine WBC  0-5    /HPF


 


Ur Epithelial Cells  Few    /LPF


 


Urine Bacteria  Few    (NONE TO FEW)  /HPF


 


H. pylori IgG Antibody     (NEGATIVE)  














  03/14/17 03/14/17 03/14/17 Range/Units





  05:35 05:40 10:35 


 


WBC   15.9 H   (4.0-10.2)  K/uL


 


RBC   4.09 L   (4.33-5.41)  M/uL


 


Hgb   12.2 L   (13.1-16.8)  g/dL


 


Hct   36.7 L   (39.0-49.0)  %


 


MCV   89.7   (84.0-98.0)  fL


 


MCH   29.8   (28.2-33.3)  pg


 


MCHC   33.2   (31.7-36.0)  g/dL


 


RDW   12.9   (11.2-14.1)  %


 


Plt Count   157   (150-350)  K/uL


 


Neut % (Auto)   88.5 H   (45.0-80.0)  %


 


Lymph % (Auto)   6.2 L   (10.0-50.0)  %


 


Mono % (Auto)   4.8   (2.0-14.0)  %


 


Eos % (Auto)   0.3   (0.0-5.0)  %


 


Baso % (Auto)   0.2   (0.0-2.0)  %


 


Neut #   14.04 H   (1.40-7.00)  K/uL


 


Lymph #   0.99   (0.50-3.50)  K/uL


 


Mono #   0.77   (0.00-1.00)  K/uL


 


Eos #   0.05   (0.00-0.50)  K/uL


 


Baso #   0.03   (0.00-0.20)  K/uL


 


PT     (9.8-11.7)  SEC


 


INR     


 


APTT     (23.5-30.0)  SEC


 


D-Dimer, Quantitative     (0-400)  ng/mL


 


Sodium     (136-145)  mmol/L


 


Potassium     (3.5-5.1)  mmol/L


 


Chloride     ()  mmol/L


 


Carbon Dioxide     (21.0-32.0)  mmol/L


 


BUN     (7-18)  mg/dL


 


Creatinine     (0.51-1.17)  mg/dL


 


Est Cr Clr Drug Dosing     mL/min


 


Estimated GFR (MDRD)     mL/min


 


Glucose     ()  mg/dL


 


POC Glucose    136 H  ()  mg/dl


 


Hemoglobin A1c     (4.3-5.7)  %


 


Lactic Acid  2.2 H    (0.4-2.0)  mmol/L


 


Uric Acid     (2.6-7.2)  mg/dL


 


Calcium     (8.5-10.1)  mg/dL


 


Magnesium     (1.8-2.4)  mg/dL


 


Total Bilirubin     (0.2-1.0)  mg/dL


 


AST     (15-37)  U/L


 


ALT     (12-78)  U/L


 


Alkaline Phosphatase     ()  IU/L


 


Creatine Kinase     ()  U/L


 


Creatine Kinase Index     (0.0-2.5)  %


 


CK-MB (CK-2)     (0.00-3.60)  ng/mL


 


Troponin I     (0.000-0.056)  ng/mL


 


Pro-B-Natriuretic Pept     (0-125)  pg/mL


 


Total Protein     (6.4-8.2)  g/dL


 


Albumin     (3.4-5.0)  g/dL


 


Triglycerides     ()  mg/dL


 


Cholesterol     (100-200)  mg/dL


 


LDL Cholesterol, Calc     (0-100)  mg/dL


 


HDL Cholesterol     (40-60)  mg/dL


 


Amylase     ()  U/L


 


Lipase     ()  U/L


 


TSH, Ultra Sensitive     (0.358-3.740)  mIU/mL


 


Specimen Type     


 


Urine Color     


 


Urine Appearance     


 


Urine pH     (5.0-9.0)  


 


Ur Specific Gravity     (1.005-1.030)  


 


Urine Protein     (NEGATIVE)  mg/dL


 


Urine Glucose (UA)     (NEGATIVE)  mg/dL


 


Urine Ketones     (NEGATIVE)  mg/dL


 


Urine Occult Blood     (NEGATIVE)  


 


Urine Nitrite     (NEGATIVE)  


 


Urine Bilirubin     (NEGATIVE)  


 


Urine Urobilinogen     (0.2-1.0)  E.U./dL


 


Ur Leukocyte Esterase     (NEGATIVE)  


 


Urine RBC     /HPF


 


Urine WBC     /HPF


 


Ur Epithelial Cells     /LPF


 


Urine Bacteria     (NONE TO FEW)  /HPF


 


H. pylori IgG Antibody     (NEGATIVE)  











VIN Results - Last 24 hrs: 


 Blood cultures x2 pending





Urine culture and sensitivity pending





Med Orders - Current: 


 Current Medications





Acetaminophen (Tylenol)  650 mg PO Q4H PRN


   PRN Reason: Pain (Mild 1-3)/fever


   Last Admin: 03/13/17 20:09 Dose:  650 mg


Albuterol (Proventil Neb Soln)  2.5 mg INH Q2H PRN


   PRN Reason: SHORTNESS OF BREATH


Albuterol/Ipratropium (Duoneb 3.0-0.5 Mg/3 Ml)  3 ml NEB Q4HRRT PRN


   PRN Reason: Dyspnea


Aspirin (Aspirin)  325 mg PO DAILY [facility]


Carvedilol (Coreg)  12.5 mg PO BID [facility]


Ceftriaxone Sodium 1 gm/ (Sodium Chloride)  100 mls @ 200 mls/hr IV Q12H [facility]


   Last Admin: 03/13/17 21:40 Dose:  200 mls/hr


Metronidazole 500 mg/ Premix  100 mls @ 100 mls/hr IV Q8H [facility]


   Last Admin: 03/14/17 04:13 Dose:  100 mls/hr


Dextrose/Sodium Chloride (Dextrose 5%-Normal Saline)  1,000 mls @ 100 mls/hr IV 

ASDIRECTED [facility]


   Last Admin: 03/14/17 01:51 Dose:  100 mls/hr


Insulin Aspart (Novolog)  0 unit SUBCUT Q6H [facility]


   PRN Reason: Protocol


   Last Admin: 03/14/17 00:07 Dose:  4 units


Latanoprost (Xalatan 0.005% Ophth Soln)  0 ml EYEBOTH BEDTIME [facility]


   Last Admin: 03/13/17 22:40 Dose:  1 drop


Lisinopril (Prinivil)  5 mg PO DAILY [facility]


Magnesium Oxide (Magnesium Oxide)  400 mg PO DAILY [facility]


   Last Admin: 03/13/17 20:09 Dose:  400 mg


Ketorolac Tromethamine 0.5% Ophth Soln  1 drop EYELF QID [facility]


   Last Admin: 03/13/17 21:57 Dose:  1 drop


Moxifloxacin [ Vigamox 0.5% Ophth Soln]  1 drop EYELF QID [facility]


   Last Admin: 03/13/17 22:04 Dose:  1 drop


Ondansetron HCl (Zofran)  4 mg IVPUSH Q8H PRN


   PRN Reason: Nausea/Vomiting


Pantoprazole Sodium (Protonix Iv)  40 mg IVPUSH Q12H [facility]


   Last Admin: 03/14/17 01:57 Dose:  40 mg


Prednisolone Acetate (Pred Forte 1% Ophth Susp)  1 ml EYELF QID [facility]


   Last Admin: 03/13/17 21:43 Dose:  1 drop


Prednisolone Acetate (Pred Forte 1% Ophth Susp)  0 ml EYERT TID [facility]


   Last Admin: 03/13/17 21:44 Dose:  1 drop


Sodium Chloride (Saline Flush)  10 ml FLUSH ASDIRECTED PRN


   PRN Reason: Keep Vein Open


   Last Admin: 03/14/17 05:04 Dose:  10 ml


Sodium Chloride (Saline Flush)  10 ml FLUSH Q12H PRN


   PRN Reason: Keep Vein Open


   Last Admin: 03/13/17 20:11 Dose:  10 ml


Sodium Chloride (Saline Flush)  10 ml FLUSH ASDIRECTED PRN


   PRN Reason: Keep Vein Open


Temazepam (Restoril)  15 mg PO BEDTIME PRN


   PRN Reason: Insomnia





Discontinued Medications





Carvedilol (Coreg)  12.5 mg PO BID [facility]


   Last Admin: 03/13/17 19:55 Dose:  Not Given


Famotidine (Pepcid)  40 mg IVPUSH ONETIME ONE


   Stop: 03/13/17 13:48


   Last Admin: 03/13/17 14:29 Dose:  40 mg


Potassium Chloride/Dextrose/Sod Cl (D5 Ns With 20 Meq Kcl)  1,000 mls @ 70 mls/

hr IV ASDIRECTED [facility]


   Last Admin: 03/13/17 20:11 Dose:  70 mls/hr


Lactated Ringer's (Ringers, Lactated)  1,000 mls @ 999 mls/hr IV .BOLUS ONE


   Stop: 03/14/17 01:25


   Last Admin: 03/14/17 00:44 Dose:  999 mls/hr


Ceftriaxone Sodium 1 gm/ (Sodium Chloride)  100 mls @ 200 mls/hr IV ONETIME ONE


   Stop: 03/14/17 01:49


   Last Admin: 03/14/17 01:59 Dose:  200 mls/hr


Iopamidol (Isovue-370 (76%))  100 ml IVPUSH ONETIME ONE


   Stop: 03/13/17 14:41


   Last Admin: 03/13/17 15:01 Dose:  100 ml


Metoprolol Tartrate (Lopressor)  2.5 mg IVPUSH ONETIME ONE


   Stop: 03/13/17 14:04


   Last Admin: 03/13/17 14:17 Dose:  2.5 mg


Metoprolol Tartrate (Lopressor)  2.5 mg IVPUSH ONETIME ONE


   Stop: 03/13/17 14:20


   Last Admin: 03/13/17 14:25 Dose:  2.5 mg


Ondansetron HCl (Zofran)  4 mg IVPUSH ONETIME ONE


   Stop: 03/13/17 14:04


   Last Admin: 03/13/17 14:19 Dose:  4 mg


Ondansetron HCl (Zofran)  4 mg IVPUSH ONETIME ONE


   Stop: 03/13/17 14:53


   Last Admin: 03/13/17 15:10 Dose:  4 mg











- Exam


Quality Assessment: Reports: supplemental oxygen, urine catheter, DVT 

prophylaxis.  Denies: central line/PICC, skin breakdown, restraints


General: Reports: alert, cooperative, no acute distress


HEENT: Reports: EOMI, Other (Persistent severe dilated left pupil secondary to 

yesterday's cataract surgery).  Denies: Mucous membr. moist/pink, Scleral 

icterus


Neck: Reports: supple, trachea midline, no JVD, no thyromegaly, carotid bruit (

Mild bilateral carotid bruits).  Denies: lymphadenopathy


Lungs: Reports: Normal respiratory effort, Rales (Bilateral basilar-mild).  

Denies: Rhonchi, Rub, Stridor, Wheezing


Cardiovascular: Reports: Regular Rate, Regular Rhythm, No Murmurs.  Denies: 

Gallops, Rubs


Abdomen: Reports: soft, no distension, tenderness (Questionable mid abdominal 

palpation pain with no evidence of masses, bruits, or AAA), abnormal bowel 

sounds (Borderline mild diffuse increased bowel sounds however not high-pitched 

in nature).  Denies: rebound, guarding, distension, CVA tenderness


 (Male) Exam: Deferred


Rectal (Males) Exam: Deferred


Back Exam: Reports: full range of motion, other (Mild to moderate kyphosis).  

Denies: CVA tenderness (L), CVA tenderness (R), muscle spasm


Extremities: Reports: normal pulses (Although only 1/4 bilateral pedal pulses 

stable from admission), no tenderness/swelling, no cyanosis, no calf tenderness

, edema (Trace bilateral pedal/pretibial)


Skin: Reports: warm, dry, intact.  Denies: ecchymosis


Neurological: Reports: no new focal deficit, other (Borderline confusion, mild 

clinical orthostasis)


Psy/Mental Status: Reports: alert, normal affect.  Denies: agitated, 

hallucinations, withdrawal symptoms





EKG INTERPRETATION


EKG Date: 03/14/17


Time: 05:34


Rhythm: NSR (With resolution of sinus tachycardia at time of admission)


Rate (beats/min): 84


Axis: LAD-left axis deviation (Extended left cardiac axis)


P-wave: enlarged (Moderate diffuse biphasic P waves)


QRS: RBBB (QRS interval of 0.12 seconds representing repolarization changes 

versus borderline conversion of previous incomplete right bundle branch block 

to complete right bundle branch block)


ST-T: normal


QT: normal


ME/PQ Interval: ME interval of 0.22 seconds representing a new first-degree AV 

block with stable severe poor R-wave progression in the anterior leads


Comparison: change from previous EKG (As above since last EKG on 3/13/17)


EKG Interpretation Comments: 





1.  No acute ischemic changes 


2.  New first degree AV block


3.  Sinus tachycardia-resolved


4.  Probable left atrial enlargement





*Q Meaningful Use (DIS)





- VTE *Q


VTE Criteria *Q: 








- Stroke *Q


Stroke Criteria *Q: 








- AMI *Q


AMI Criteria *Q:

## 2017-03-14 NOTE — [email or web address]
- Free Text/Narrative


Note: 





The patient with some moderate hypotension this evening, including systolic 

blood pressures in the 70s, with some possible mild increased pallor since 

admission.  Otherwise my physical exam at this time is significantly improved 

from admission with no abdominal palpation pain, etc.  The patient did tolerate 

some soup earlier this evening and denies any abdominal pain, nausea, emesis, 

chest pain/pressure, or any other anginal-type symptoms.  Note no tachycardia 

at this time with evening dose of Coreg held previously. Patient was also 

febrile earlier this evening with suspicions of possible aspiration pneumonia 

with blood cultures already collected and also IV Rocephin and IV Flagyl 

already started. Stat blood work ordered and reviewed with new leukocytosis, 

some decrease of his hemoglobin and some increased renal insufficiency. Extra 

IV Rocephin dose secondary to possible sepsis, although lactic acid was 

improved earlier this evening. IVB of LR in progress with change of IV fluids 

at higher rate secondary to high normal potassium and hypotension. Repeat blood 

work, EKG, and Abdominal Xrays later this morning. UA with C&S also ordered. 

Consider transfer of patient depending on his clinical course. High dose IV 

Protonix as GI prophylaxis with high dose IV Pepcid already given in the ER. 

Hemoccult ordered.

## 2019-07-12 ENCOUNTER — HOSPITAL ENCOUNTER (EMERGENCY)
Dept: HOSPITAL 52 - LL.ED | Age: 84
Discharge: NURSING, REHAB OR LONG-TERM CARE | End: 2019-07-12
Payer: MEDICARE

## 2019-07-12 VITALS — SYSTOLIC BLOOD PRESSURE: 133 MMHG | DIASTOLIC BLOOD PRESSURE: 79 MMHG | HEART RATE: 64 BPM

## 2019-07-12 DIAGNOSIS — L57.0: ICD-10-CM

## 2019-07-12 DIAGNOSIS — I25.10: ICD-10-CM

## 2019-07-12 DIAGNOSIS — Z79.82: ICD-10-CM

## 2019-07-12 DIAGNOSIS — N28.9: ICD-10-CM

## 2019-07-12 DIAGNOSIS — I49.3: ICD-10-CM

## 2019-07-12 DIAGNOSIS — M15.0: ICD-10-CM

## 2019-07-12 DIAGNOSIS — S72.145A: Primary | ICD-10-CM

## 2019-07-12 DIAGNOSIS — Z79.899: ICD-10-CM
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
## 2019-07-12 DIAGNOSIS — J43.1: ICD-10-CM

## 2019-07-12 DIAGNOSIS — Z79.84: ICD-10-CM

## 2019-07-12 DIAGNOSIS — E11.29: ICD-10-CM

## 2019-07-12 PROCEDURE — 73552 X-RAY EXAM OF FEMUR 2/>: CPT

## 2019-07-12 PROCEDURE — 96374 THER/PROPH/DIAG INJ IV PUSH: CPT

## 2019-07-12 PROCEDURE — 73590 X-RAY EXAM OF LOWER LEG: CPT

## 2019-07-12 PROCEDURE — 72170 X-RAY EXAM OF PELVIS: CPT

## 2019-07-12 PROCEDURE — 73700 CT LOWER EXTREMITY W/O DYE: CPT

## 2019-07-12 PROCEDURE — 96375 TX/PRO/DX INJ NEW DRUG ADDON: CPT

## 2019-07-12 PROCEDURE — 99285 EMERGENCY DEPT VISIT HI MDM: CPT

## 2019-07-12 NOTE — EDM.PDOC
ED HPI GENERAL MEDICAL PROBLEM





- General


Chief Complaint: Lower Extremity Injury/Pain


Stated Complaint: left hip pain


Time Seen by Provider: 19 13:54


Source of Information: Reports: Patient, EMS, Family (Wife), Old Records ([facility] chart/EMR).  Denies: EMS Notes Reviewed (Not 

available at time of dictation)


History Limitations: Reports: No Limitations





- History of Present Illness


INITIAL COMMENTS - FREE TEXT/NARRATIVE: 





Patient was brought to the emergency room via ambulance with paramedic 

accompaniment for evaluation of initial 1010 left hip and femoral pain with 

radiation to the knee and proximal aspect of the tibia and fibula. Note that 

the patient was just completing his 2 mile walk as cardiac rehabilitation which 

he does on a regular basis, when he accidentally stepped wrong off of a curb at 

the [facility]. He twisted his left leg but denies any head injury, loss of 

consciousness, change in mental status, syncope, neck/back pain, paresthesias, 

neurological deficits, or other complaints or injuries. The patient denies any 

chest pain/pressure, heart flutter, dizziness, orthostasis, orthopnea, 

diaphoresis, recent decreased exercise tolerance, or any other anginal-type 

symptoms. No recent history of abdominal pain, heartburn, nausea, diarrhea, 

melena, gross hematochezia, or any food intolerance, including fatty foods, 

etc.. The patient also denies any recent fever, cough, wheezing, dyspnea, etc.. 

No history of recent headaches, visual changes, diplopia, change in mental 

status, or other change in neurological status. Patient did have an IV placed 

and received 50 g of fentanyl prior to arrival with essential resolution of 

his leg pain at this time. Note that the patient actually fell onto his right 

side with no discomfort on the side with probable twisting injury of his left 

leg and hip.


Onset: Today, Sudden


Onset Date: 19


Onset Time: 13:00


Duration: Constant, Improving


Location: Reports: Lower Extremity, Left, Radiates to (As above).  Denies: Head

, Face, Neck, Chest, Abdomen, Back, Pelvis, Upper Extremity, Left, Upper 

Extremity, Right


Quality: Reports: Sharp


Severity: Severe


Improves with: Reports: Rest


Worsens with: Reports: Movement


Context: Reports: Trauma (As above)


Associated Symptoms: Denies: Confusion, Chest Pain, Cough, Diaphoresis, Fever/

Chills, Headaches, Loss of Appetite, Malaise, Nausea/Vomiting, Seizure, 

Shortness of Breath, Syncope, Weakness


Treatments PTA: Reports: IV/IO, Other Medication(s)


  ** Left Upper Leg


Pain Score (Numeric/FACES): 10 (Improved on arrival as above)





- Related Data


 Allergies











Allergy/AdvReac Type Severity Reaction Status Date / Time


 


No Known Allergies Allergy   Verified 19 15:03











Home Meds: 


 Home Meds





Aspirin 325 mg PO DAILY 17 [History]


Carvedilol 12.5 mg PO BID 17 [History]


Cholecalciferol (Vitamin D3) [Vitamin D3] 1,000 units PO DAILY 17 [History

]


Cyanocobalamin (Vitamin B-12) [Vitamin B-12] 1,000 mcg PO DAILY 17 [

History]


Latanoprost [Xalatan 0.005% Ophth Soln] 1 drop EYEBOTH BEDTIME 17 [History

]


Lisinopril 5 mg PO DAILY 17 [History]


Magnesium Oxide/Mag AA Chelate [Magnesium] 300 mg PO DAILY 17 [History]


Milk Thistle Seed Extract [Milk Thistle] 100 mg PO DAILY 17 [History]


Ubidecarenone [Co Q-10] 100 mg PO DAILY 17 [History]


metFORMIN HCl [Metformin HCl] 1,000 mg PO BID 17 [History]











Past Medical History


HEENT History: Reports: Allergic Rhinitis, Cataract, Glaucoma, Hard of Hearing, 

Impaired Vision, Other (See Below).  Denies: Macular Degeneration, Otitis Media

, Retinal Detachment


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 

current hearing aids


Cardiovascular History: Reports: Arrhythmia, CAD, Cardiomyopathy, Heart Failure

, High Cholesterol, Hypertension, PVD, Other (See Below).  Denies: Afib, 

Aneurysm, Blood Clots/VTE/DVT, Heart Murmur, MI, Syncope


Other Cardiovascular History: Incomplete right bundle branch block, left and 

right ventricular hypertrophy with left ventricular systolic dysfunction by 

echocardiogram on 02, coronary artery disease by echocardiogram with 

negative heart catheterization, dyslipidemia, mild carotid occlusive disease. 

First-degree AV block. D-dimer elevation. PVCs.


Respiratory History: Reports: Bronchitis, Recurrent, COPD, Intubation, Previous

, Pulmonary Fibrosis, Other (See Below).  Denies: Intubation, Difficult, PE, 

Pneumonia, Recurrent, Pneumothorax, Sleep Apnea, TB


Other Respiratory History: COPD and mild pulmonary fibrosis by chest x-ray with 

no current nebulizer therapy. Severe pneumonia on 3/13/17 with secondary sepsis 

and septic shock.


Gastrointestinal History: Reports: Cholelithiasis, Chronic Diarrhea, Colon Polyp

, Diverticulosis, Pancreatitis, Other (See Below).  Denies: Bowel Obstruction, 

Celiac Disease, Chronic Constipation, Gastritis, GERD, GI Bleed, Hepatitis, 

Hiatal Hernia, Inflammatory Bowel Disease, Irritable Bowel Syndrome, Jaundice


Other Gastrointestinal History: Nonsymptomatic cholelithiasis with no surgery 

required diagnosed by CT scan on 12, pancreatitis with mild secondary ileus 

on 12, excision of 3 benign hyperplastic polyps from the rectosigmoid 

region a colonoscopy on 16, sigmoid diverticulosis


Genitourinary History: Reports: Acute Renal Failure, BPH, Chronic Renal 

Insuffiency, Diabetic Nephropathy, Urinary Incontinence, UTI, Recurrent, Other (

See Below)


Other Genitourinary History: Bladder diverticulum with recurrent UTIs initially 

diagnosed on 6/3/02, mild proteinuria secondary to his diabetes, erectile 

dysfunction, left-sided hydrocele, urosepsis in the . Acute renal failure 

on top of previous chronic renal insufficiency at time of sepsis in 2017 

as above.


Musculoskeletal History: Reports: Arthritis, Back Pain, Chronic, Fracture, Gout

, Neck Pain, Chronic, Osteoarthritis, Osteoporosis, RA, Other (See Below).  

Denies: Amputation, SLE


Other Musculoskeletal History: Positive ISAAC on 02 with no history of lupus

; polymyalgia rheumatica, bilateral pes planus, clavicular fracture at age 10

side unknown. Mild scoliosis.


Neurological History: Reports: Neuropathy, Diabetic, Neuropathy, Peripheral.  

Denies: Cerebral Aneurysms, Concussion, CVA, Headaches, Chronic, Head Trauma, 

Migraines, MS, Parkinson's, Seizure, TIA


Psychiatric History: Reports: None.  Denies: Abuse, Victim of, ADD, ADHD, 

Addiction, Anxiety, Depression, Psych Hospitalization(s), Psychosis, PTSD, 

Suicide Attempt, Suicidal Ideation


Endocrine/Metabolic History: Reports: Diabetes, Type II, Osteoporosis, Other (

See Below).  Denies: Diabetes, Type I, Diabetes Mellitus, Type 3c, 

Hypothyroidism, IDDM


Other Endocrine/Metabolic History: Gynecomastia. Hypoalbuminemia. 

Hypomagnesemia.


Hematologic History: Reports: Anemia, B12 Deficiency, Blood Transfusion(s), 

Other (See Below).  Denies: Iron Deficiency


Other Hematologic History: transfusion of 2 units on 04 postoperative 

from total knee arthroplasty as below


Immunologic History: Reports: None.  Denies: AIDS, SLE


Oncologic (Cancer) History: Reports: Bladder, Other (See Below).  Denies: Basal 

Cell Carcinoma, Colon, Hodgkin's Lymphoma, Leukemia, Lymphoma, Malignant 

Melanoma, Metastatic, Non-Hodgkin's Lymphoma, Prostate, Squamous Cell Carcinoma


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


Dermatologic History: Reports: None.  Denies: Eczema, Psoriasis





- Infectious Disease History


Infectious Disease History: Reports: Chicken Pox.  Denies: C-Difficile, Measles

, Meningitis, Mononucleosis, MRSA, Mumps, Pertussis (Whooping Cough), Rubella, 

Scarlet Fever, Shingles, TB, VRE





- Past Surgical History


Head Surgeries/Procedures: Reports: None


HEENT Surgical History: Reports: Adenoidectomy, Cataract Surgery, Eye Surgery, 

Oral Surgery, Tonsillectomy, Other (See Below).  Denies: Laser Surgery, LASIK, 

Myringotomy w Tube(s), Naso-Sinus Surgery


Other HEENT Surgeries/Procedures: Right-sided cataract surgery on 17. Left-

sided cataract surgery on 3/13/17. Tonsillectomy and adenoidectomy in 1949. 

[place] teeth extraction 4 in 1952. Additional multiple teeth extractions.


Cardiovascular Surgical History: Reports: None.  Denies: Varicose


Respiratory Surgical History: Reports: None.  Denies: Thoracentesis


GI Surgical History: Reports: Colonoscopy, Hernia, Inguinal, Polypectomy, Other 

(See Below).  Denies: Appendectomy, Cholecystectomy, EGD, Hernia, Abdominal, 

Hernia Repair/Other


Other GI Surgeries/Procedures: Last colonoscopy on 3/27/12 with previous 

colonoscopy and polypectomy as above on 06. Right inguinal hernia repair 

on 12.


Male  Surgical History: Reports: TUIP, Other (See Below).  Denies: 

Circumcision, Vasectomy


Other Male  Surgeries/Procedures: Right testicular orchiectomy for benign 

disease in . A left hydrocele repair on 05. TUIP with fulguration of 

bladder diverticulum on 02.


Endocrine Surgical History: Reports: None.  Denies: Thyroid Biopsy


Neurological Surgical History: Reports: None.  Denies: C-Spine, Discectomy, 

Laminectomy, Lumbar Spine, Sacral Spine, Spinal Fusion, Thoracic Spine, 

Vertebroplasty


Musculoskeletal Surgical History: Reports: Joint Replacement, Knee Replacement, 

Shoulder Surgery, Other (See Below).  Denies: Arthroscopic Procedure, Carpal 

Tunnel, Ganglion Cyst, ORIF


Other Musculoskeletal Surgeries/Procedures:: Bilateral total knee arthroplasty 

on 12/10/04. Right rotator cuff repair on 01.


Oncologic Surgical History: Reports: None


Dermatological Surgical History: Reports: None





- Past Imaging History


Past Imaging History: Reports: Angiography (Negative heart catheterization on 02.), Cardiac Echo (02), Carotid US (07), CAT Scan (CT scan of the 

abdomen and pelvis on 18 and 12. CT of the brain on 11.), DEXA 

Scan (Last DEXA scan on 13 with previous evaluation on 11.), Stress 

Testing (Borderline positive Cardiolite stress test on 18 with borderline 

septal ischemia and stable inferior wall fixed defect with ejection fraction of 

64% and no further heart catheterization, etc.), Ultrasound (Abdominal 

ultrasound on 18. Abdominal aortic ultrasound on 3/14/17 and 07. 

Bladder ultrasound on 6/3/02.), Venous Doppler (Legs bilaterally on 3/14/17.)





Social & Family History





- Family History


HEENT: Reports: None.  Denies: Glaucoma, Macular Degeneration, Retinal 

Detachment


Cardiac: Reports: CAD, MI, Other (See Below).  Denies: Afib, Aneurysm, 

Arrhythmia, Bypass, Cardiomyopathy, Heart Failure, High Cholesterol, 

Hypertension, Stent, Syncope


Other Cardiac Family History: Father with possible fatal MI in his 70s, mother 

with possible fatal MI in her 70s


Respiratory: Reports: None.  Denies: Asthma, COPD, PE, Pneumothorax, Sleep Apnea


GI: Reports: None.  Denies: Celiac Disease, Cholelithiasis, Colon Polyps, GERD, 

GI bleed, Hepatitis, Inflammatory Bowel Disease, Irritable Bowel Syndrome, 

Pancreatitis, PUD


: Reports: None.  Denies: Renal Calculus, Renal Disease/Insufficiency


OBGYN: Reports: None.  Denies: Endometriosis, Recurrent Spontaneous 


Musculoskeletal: Reports: Arthritis, Osteoarthritis, Other (See Below).  Denies

: Gout, RA, SLE


Other Musculoskeletal Family History: Father, 2 paternal aunts with arthritis.


Neurological: Reports: None


Psychiatric: Reports: None.  Denies: Abuse, Victim of, ADD, ADHD, Anxiety, 

Depression, Psych Hospitalization(s), PTSD, Suicide Attempt


Endocrine/Metabolic: Reports: Diabetes, type II, Other (See Below).  Denies: 

Diabetes, Type I, Diabetes Mellitus, Type 3c, Hypothyroidism, IDDM


Other Endocrine/Metabolic Family History: Father with possible diabetes mellitus


Hematologic: Reports: None.  Denies: Anemia


Immunologic: Reports: None.  Denies: AIDS, HIV


Dermatologic: Reports: None.  Denies: Eczema, Psoriasis


Oncologic: Reports: Prostate, Other (See Below).  Denies: Bladder, Colon, 

Hodgkin's Lymphoma, Leukemia, Lung, Lymphoma, Metastatic, Non-Hodgkin's Lymphoma

, Pancreatic, Skin


Other Oncologic Family History: Father with possible prostate cancer





- Tobacco Use


Smoking Status *Q: Former Smoker


Tobacco Use Within Last Twelve Months: No


Years of Tobacco use: 14


Packs/Tins Daily: 0.8


Packs/Tins Daily Comment: Patient smoked 0.75 packs of cigarettes per day 

between ages 21 and 35 with occasional additional cigar use.


Used Tobacco, but Quit: Yes


Month/Year Tobacco Last Used: As above


Smoking Cessation Information Provided To Patient: No


Second Hand Smoke Exposure: No


Second Hand Smoke Education Provided: No





- Caffeine Use


Caffeine Use: Reports: Coffee (2 cups of coffee per day).  Denies: Energy Drinks

, Soda, Tea





- Alcohol Use


Alcohol Use History: No


Days Per Week of Alcohol Use: 0


Number of Drinks Per Day: 0


Number of Drinks Per Day Comment: No use since  with borderline alcohol 

abuse with no treatment prior to that time.


Total Drinks Per Week: 0


Alcohol Use in Last Twelve Months: No





- Recreational Drug Use


Recreational Drug Use: No


Drug Use in Last 12 Months: No


Recreational Drug Type: Denies: Amphetamines (Speed), Cocaine, Heroin, 

Inhalants (Glues, Solvents, Aerosols), LSD (Acid), Marijuana/Hashish, 

Methamphetamine, Morphine, Oxycodone





- Living Situation & Occupation


Living situation: Reports:  (1957, 2 children)


Occupation: Retired (Semiretired farmer and  toy tractor collecter)





Review of Systems





- Review of Systems


Review Of Systems: ROS reveals no pertinent complaints other than HPI.





ED EXAM, GENERAL





- Physical Exam


Exam: See Below


Exam Limited By: No Limitations


General Appearance: Alert, WD/WN, No Apparent Distress


Eye Exam: Bilateral Eye: EOMI, Normal Inspection (No nystagmus; patient does 

have his glasses.), PERRL


Ears: Normal External Exam, Normal Canal, Normal TMs, Hearing Loss (Moderate 

presbycusis bilaterally with no current hearing aids ).  No: Hearing Grossly 

Normal


Nose: Normal Inspection, Normal Mucosa, No Blood


Throat/Mouth: Normal Lips, Normal Oropharynx, Normal Voice, No Airway 

Compromise.  No: Normal Teeth (Multiple missing teeth and additional caries 

especially in the right upper premolar region. No drainage or gingiva swelling)

, Normal Gums, Dysphagia, Perioral Cyanosis


Head: Atraumatic, Normocephalic.  No: Facial Swelling, Facial Tenderness, Sinus 

Tenderness


Neck: Normal Inspection, Supple, Non-Tender, Full Range of Motion, Carotid 

Bruit (Mild bilateral carotid bruits).  No: Lymphadenopathy (L), 

Lymphadenopathy (R), Thyromegaly


Respiratory/Chest: No Respiratory Distress, Lungs Clear, Normal Breath Sounds, 

No Accessory Muscle Use, Chest Non-Tender.  No: Pleural Rub, Retractions


Cardiovascular: Normal Peripheral Pulses, Regular Rate, Rhythm, No Edema, No 

Gallop, No JVD, No Murmur, No Rub.  No: Gallop/S3, Gallop/S4, Friction Rub


Peripheral Pulses: 2+: Radial (R), Femoral (L), Dorsalis Pedis (L), Dorsalis 

Pedis (R)


GI/Abdominal: Normal Bowel Sounds, Soft, Non-Tender, No Organomegaly, No 

Distention, No Abnormal Bruit, No Mass, Pelvis Stable.  No: Guarding


 (Male) Exam: Deferred


Rectal (Males) Exam: Deferred


Back Exam: Full Range of Motion, Other (Mild scoliosis).  No: CVA Tenderness (L)

, CVA Tenderness (R), Muscle Spasm, Paraspinal Tenderness, Vertebral Tenderness


Extremities: No Pedal Edema, Leg Pain (Moderate left proximal femoral 

outpatient pain and pain with movement which patient rates at 6/10 with no 

deformity, crepitation, rotation or sign of fracture. Left knee does not show 

any effusion or gross instability, however complete exam could not be conducted 

secondary patient's discomfort), Limited Range of Motion (Left leg secondary to 

discomfort).  No: Joint Swelling, Sree's Sign


Neurological: Alert, Oriented, CN II-XII Intact, Normal Cognition, Normal Gait, 

Normal Reflexes (Negative Babinski's), No Motor/Sensory Deficits


Psychiatric: Normal Affect, Normal Mood


Skin Exam: Warm, Dry, Intact, Normal Color, No Rash.  No: Diaphoretic, 

Ecchymosis, Pallor, Wound/Incision


Lymphatic: No Adenopathy





Course





- Vital Signs


Last Recorded V/S: 


 Last Vital Signs











Temp  36.7 C   19 14:03


 


Pulse  64   19 14:03


 


Resp  16   19 14:03


 


BP  133/79   19 14:03


 


Pulse Ox  98   19 14:03











 Vital Signs - 24 hr











  19





  14:03


 


Temperature [ 36.7 C





Temporal] 


 


Pulse, 64





Peripheral [ 





Pulse Oximetry] 


 


Respiratory 16





Rate 


 


Blood Pressure 133/79





[Left Arm] 


 


O2 Sat by Pulse 98





Oximetry 














- Orders/Labs/Meds


Orders: 


 Active Orders 24 hr











 Category Date Time Status


 


 Cardiac Monitoring [RC] .AS DIRECTED Care  19 16:19 Active


 


 Femur Min 2V Lt [CR] Stat Exams  19 13:55 Taken


 


 Hip wo Cont Lt [CT] Stat Exams  19 14:41 Taken


 


 Pelvis 1V or 2V [CR] Stat Exams  19 13:55 Taken


 


 Tibia Fibula Lt [CR] Stat Exams  19 14:31 Taken


 


 Obtain Past Medical Record [OM.PC] Routine Oth  19 13:55 Active











Labs: 


none


Meds: 


Medications














Discontinued Medications














Generic Name Dose Route Start Last Admin





  Trade Name Desiree  PRN Reason Stop Dose Admin


 


Fentanyl  100 mcg  19 16:34  19 16:42





  Sublimaze  IVPUSH  19 16:35  100 mcg





  ONETIME ONE   Administration





     





     





     





     


 


Ondansetron HCl  4 mg  19 16:34  19 16:42





  Zofran  IVPUSH  19 16:35  4 mg





  ONETIME ONE   Administration





     





     





     





     














- Radiology Interpretation


Free Text/Narrative:: 





X-rays of the pelvis, one view, shows moderate osteophytic changes but no 

evidence of fracture, dislocation, etc.





X-rays of the left tibia and fibula, 2 views, shows status post left knee total 

arthroplasty, which is appear intact with no evidence of loosening, etc. No 

evidence of fracture or dislocation. Moderate osteoarthritic changes.





X-rays of the left femur, 2 views, shows no evidence of fracture, dislocation, 

etc. with moderate osteoarthritic changes and only partial view of the above 

left knee total arthroplasty. Note vascularity in the femoral neck region, 

however no direct evidence of fracture by my evaluation. Note, however, 

subsequent telephone consultation at 15:37 hours with the radiology department 

at [facility] with verbal report of hairline nondisplaced 

intertrochanteric fracture noted.





Cardiac monitor shows normal sinus rhythm in the 70s to 90s with very 

occasional mostly uniform PVCs but no other significant arrhythmia.





Official report of CT scan of the left hip confirms suspected nondisplaced 

mildly comminuted intertrochanteric fracture. 


NOTE: preliminary verbal report of CT scan was not received from [facility]. as requested





Departure





- Departure


Time of Disposition: 17:15


Disposition: DC/Tfer to Acute Hospital 02


Condition: Good


Clinical Impression: 


 Renal insufficiency, Caries, Actinic keratosis, PVCs (premature ventricular 

contractions)





Intertrochanteric fracture of left hip


Qualifiers:


 Encounter type: initial encounter Fracture type: closed Fracture alignment: 

nondisplaced Qualified Code(s): S72.145A - Nondisplaced intertrochanteric 

fracture of left femur, initial encounter for closed fracture





Osteoarthritis


Qualifiers:


 Osteoarthritis location: multiple joints Osteoarthritis type: primary 

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





COPD (chronic obstructive pulmonary disease)


Qualifiers:


 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 

Panlobular emphysema





Coronary artery disease


Qualifiers:


 Coronary Disease-Associated Artery/Lesion type: native artery Native vs. 

transplanted heart: native heart Associated angina: without angina Qualified 

Code(s): I25.10 - Atherosclerotic heart disease of native coronary artery 

without angina pectoris





Diabetes mellitus


Qualifiers:


 Diabetes mellitus type: type 2 Diabetes mellitus long term insulin use: 

without long term use Diabetes mellitus complication status: with kidney 

complications Diabetes mellitus complication detail: with microalbuminuria 

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 

complication








- Discharge Information


*PRESCRIPTION DRUG MONITORING PROGRAM REVIEWED*: Not Applicable


*COPY OF PRESCRIPTION DRUG MONITORING REPORT IN PATIENT ALEXIS: Not Applicable


Referrals: 


Tereso Hauser MD [Primary Care Provider] - 


Forms:  ED Department Discharge, Interfacility Transfer EMTALA


Care Plan Goals: 


See plan





- Problem List & Annotations


(1) Intertrochanteric fracture of left hip


SNOMED Code(s): 131017793


   Code(s): S72.142A - DISPLACED INTERTROCHANTERIC FRACTURE OF LEFT FEMUR, INIT

   Status: Acute   Priority: High   Onset Date: 19   Annotation/Comment:: 

Initial telephone consultation with [facility] at 15:37 hours 

with subsequent telephone consultation at 16:10 hours with Dr. Crodova, 

hospitalist, who does accept the patient for direct admission, with no further 

treatment recommendations given. She is aware that no blood work has been 

conducted in this facility to this point. Last oral intake at noon today. Vital 

signs and clinical exam stable at time of transfer. Ambulance transfer with 

paramedic accompaniment. Additional fentanyl and Zofran were given in the 

emergency room as above prior to transfer with further pain management by 

paramedics depending on his clinical course. Note that secondary PACS 

transmission problems final CT results were not received until the patient left 

the facility.    


Qualifiers: 


   Encounter type: initial encounter   Fracture type: closed   Fracture 

alignment: nondisplaced   Qualified Code(s): S72.145A - Nondisplaced 

intertrochanteric fracture of left femur, initial encounter for closed fracture

   





(2) Coronary artery disease


SNOMED Code(s): 62893406


   Code(s): I25.10 - ATHSCL HEART DISEASE OF NATIVE CORONARY ARTERY W/O ANG 

PCTRS   Status: Chronic   Priority: High   Annotation/Comment:: No recent chest 

pain or anginal type symptoms. Note previous history of borderline CHF and 

arrhythmia as above.   


Qualifiers: 


   Coronary Disease-Associated Artery/Lesion type: native artery   Native vs. 

transplanted heart: native heart   Associated angina: without angina   

Qualified Code(s): I25.10 - Atherosclerotic heart disease of native coronary 

artery without angina pectoris   





(3) Diabetes mellitus


SNOMED Code(s): 44706831


   Code(s): E11.9 - TYPE 2 DIABETES MELLITUS WITHOUT COMPLICATIONS   Status: 

Chronic   Priority: Medium   Annotation/Comment:: Stable by patient history 

with patient taking a.m. Accu-Cheks 3 times per week, which are averaging in 

the 120s.   


Qualifiers: 


   Diabetes mellitus type: type 2   Diabetes mellitus long term insulin use: 

without long term use   Diabetes mellitus complication status: with kidney 

complications   Diabetes mellitus complication detail: with microalbuminuria   

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 

complication; R80.9 - Proteinuria, unspecified   





(4) Osteoarthritis


SNOMED Code(s): 714585385


   Code(s): M19.90 - UNSPECIFIED OSTEOARTHRITIS, UNSPECIFIED SITE   Status: 

Chronic   Priority: Medium   Annotation/Comment:: Otherwise stable by history.  

History of hyperuricemia and rheumatoid arthritis as above.   


Qualifiers: 


   Osteoarthritis location: multiple joints   Osteoarthritis type: primary   

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   





(5) COPD (chronic obstructive pulmonary disease)


SNOMED Code(s): 20676531


   Code(s): J44.9 - CHRONIC OBSTRUCTIVE PULMONARY DISEASE, UNSPECIFIED   Status

: Chronic   Priority: Medium   Annotation/Comment:: No recent fever or 

bronchitic type symptoms.   


Qualifiers: 


   COPD type: emphysema   Emphysema type: panlobular   Qualified Code(s): J43.1 

- Panlobular emphysema   





(6) Renal insufficiency


SNOMED Code(s): 720151389, 074021347


   Code(s): N28.9 - DISORDER OF KIDNEY AND URETER, UNSPECIFIED   Status: Acute 

  Priority: High   Annotation/Comment:: History of diabetic nephropathy. 

Continue to observe closely by accepting providers especially in light of 

previous history of acute renal failure from sepsis as above.   





(7) Caries


SNOMED Code(s): 44009999


   Code(s): K02.9 - DENTAL CARIES, UNSPECIFIED   Status: Chronic   Priority: 

Medium   Annotation/Comment:: Patient and his wife were counseled on the 

importance of dental hygiene and its affect on health issues, etc. Patient 

should follow-up with his dentist ASAP after his current condition stabilizes. 

  





(8) Actinic keratosis


SNOMED Code(s): 601578232


   Code(s): L57.0 - ACTINIC KERATOSIS   Status: Chronic   Priority: Medium   

Annotation/Comment:: Multiple areas of actinic keratosis on his forearms 

bilaterally, right greater than left, with continued close follow-up by his 

regular provider, etc. The patient was counseled on avoidance of excessive sun 

exposure, etc..   





(9) PVCs (premature ventricular contractions)


SNOMED Code(s): 51641556


   Code(s): I49.3 - VENTRICULAR PREMATURE DEPOLARIZATION   Status: Chronic   

Priority: Medium   Annotation/Comment:: Nonsymptomatic. Continue to observe 

closely.   





- Problem List Review


Problem List Initiated/Reviewed/Updated: Yes





- My Orders


Last 24 Hours: 


My Active Orders





19 13:55


Femur Min 2V Lt [CR] Stat 


Pelvis 1V or 2V [CR] Stat 


Obtain Past Medical Record [OM.PC] Routine 





19 14:31


[place]ia Fibula Lt [CR] Stat 





19 14:41


Hip wo Cont Lt [CT] Stat 





19 16:19


Cardiac Monitoring [RC] .AS DIRECTED 














- Assessment/Plan


Last 24 Hours: 


My Active Orders





19 13:55


Femur Min 2V Lt [CR] Stat 


Pelvis 1V or 2V [CR] Stat 


Obtain Past Medical Record [OM.PC] Routine 





19 14:31


Tibia Fibula Lt [CR] Stat 





19 14:41


Hip wo Cont Lt [CT] Stat 





19 16:19


Cardiac Monitoring [RC] .AS DIRECTED 











Assessment:: 





As above


Plan: 





As above. Extensive precautions were given to the patient and his wife, who are 

in agreement with the treatment plan. Ambulance transfer to [place] with 

paramedic accompaniment as above.

## 2019-07-16 ENCOUNTER — HOSPITAL ENCOUNTER (INPATIENT)
Dept: HOSPITAL 52 - LL.MS | Age: 84
LOS: 17 days | Discharge: HOME | DRG: 560 | End: 2019-08-02
Attending: FAMILY MEDICINE | Admitting: FAMILY MEDICINE
Payer: MEDICARE

## 2019-07-16 DIAGNOSIS — Z85.51: ICD-10-CM

## 2019-07-16 DIAGNOSIS — I13.0: ICD-10-CM

## 2019-07-16 DIAGNOSIS — I73.9: ICD-10-CM

## 2019-07-16 DIAGNOSIS — M19.90: ICD-10-CM

## 2019-07-16 DIAGNOSIS — M54.9: ICD-10-CM

## 2019-07-16 DIAGNOSIS — E78.5: ICD-10-CM

## 2019-07-16 DIAGNOSIS — E78.00: ICD-10-CM

## 2019-07-16 DIAGNOSIS — S72.302D: Primary | ICD-10-CM

## 2019-07-16 DIAGNOSIS — N18.9: ICD-10-CM

## 2019-07-16 DIAGNOSIS — I25.10: ICD-10-CM

## 2019-07-16 DIAGNOSIS — H54.7: ICD-10-CM

## 2019-07-16 DIAGNOSIS — Z79.899: ICD-10-CM

## 2019-07-16 DIAGNOSIS — G89.29: ICD-10-CM

## 2019-07-16 DIAGNOSIS — J44.9: ICD-10-CM

## 2019-07-16 DIAGNOSIS — I50.9: ICD-10-CM

## 2019-07-16 DIAGNOSIS — E03.9: ICD-10-CM

## 2019-07-16 DIAGNOSIS — Z79.01: ICD-10-CM

## 2019-07-16 DIAGNOSIS — E11.40: ICD-10-CM

## 2019-07-16 DIAGNOSIS — Z79.84: ICD-10-CM

## 2019-07-16 DIAGNOSIS — N40.0: ICD-10-CM
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
## 2019-07-16 DIAGNOSIS — H91.93: ICD-10-CM

## 2019-07-16 DIAGNOSIS — Z79.82: ICD-10-CM

## 2019-07-16 DIAGNOSIS — H40.9: ICD-10-CM

## 2019-07-16 RX ADMIN — HYDROCODONE BITATRATE AND ACETAMINOPHEN PRN TAB: 5; 325 TABLET ORAL at 20:46

## 2019-07-16 NOTE — PCM.HP
H&P History of Present Illness





- General


Date of Service: 19


Admit Problem/Dx: 


 Admission Diagnosis/Problem





Admission Diagnosis/Problem      Fracture of shaft of left femur








Source of Information: Patient, Old Records





- History of Present Illness


Initial Comments - Free Text/Narative: 


Patient is a 84-year-old who fell and fractured his left femur he was taken to 

[facility] and had it repaired sent back home to [place] for PTOT he was admitted to 

swing bed for PTOT.





Symptom Onset Date: 19


Quality: Reports: Ache


Severity: Moderate


Improves with: Reports: Immobilization, Medication


Worsens with: Reports: Movement





- Related Data


Allergies/Adverse Reactions: 


 Allergies











Allergy/AdvReac Type Severity Reaction Status Date / Time


 


No Known Allergies Allergy   Verified 19 15:03











Home Medications: 


 Home Meds





Aspirin 325 mg PO DAILY 17 [History]


Carvedilol 12.5 mg PO BID 17 [History]


Cholecalciferol (Vitamin D3) [Vitamin D3] 1,000 units PO DAILY 17 [History

]


Cyanocobalamin (Vitamin B-12) [Vitamin B-12] 1,000 mcg PO DAILY 17 [

History]


Latanoprost [Xalatan 0.005% Ophth Soln] 1 drop EYEBOTH BEDTIME 17 [History

]


Lisinopril 5 mg PO DAILY 17 [History]


Magnesium Oxide/Mag AA Chelate [Magnesium] 300 mg PO DAILY 17 [History]


Milk Thistle Seed Extract [Milk Thistle] 100 mg PO DAILY 17 [History]


Ubidecarenone [Co Q-10] 100 mg PO DAILY 17 [History]


metFORMIN HCl [Metformin HCl] 1,000 mg PO BID 17 [History]


Acetaminophen [Acetaminophen Extra Strength] 1,000 mg PO Q6HR PRN 19 [

History]


Enoxaparin [Lovenox] 40 mg SUBCUT DAILY 19 [History]


Hydrocodone/Acetaminophen [Hydrocodon-Acetaminophen 5-325] 1 each PO Q4HR PRN  [History]


Non-Formulary Medication [NF Drug] 1 tab PO DAILY 19 [History]











Past Medical History


HEENT History: Reports: Allergic Rhinitis, Cataract, Glaucoma, Hard of Hearing, 

Impaired Vision, Other (See Below).  Denies: Macular Degeneration, Otitis Media

, Retinal Detachment


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 

current hearing aids


Cardiovascular History: Reports: Arrhythmia, CAD, Cardiomyopathy, Heart Failure

, High Cholesterol, Hypertension, PVD, Other (See Below).  Denies: Afib, 

Aneurysm, Blood Clots/VTE/DVT, Heart Murmur, MI, Syncope


Other Cardiovascular History: Incomplete right bundle branch block, left and 

right ventricular hypertrophy with left ventricular systolic dysfunction by 

echocardiogram on 02, coronary artery disease by echocardiogram with 

negative heart catheterization, dyslipidemia, mild carotid occlusive disease. 

First-degree AV block. D-dimer elevation. PVCs.


Respiratory History: Reports: Bronchitis, Recurrent, COPD, Intubation, Previous

, Pulmonary Fibrosis, Other (See Below).  Denies: Intubation, Difficult, PE, 

Pneumonia, Recurrent, Pneumothorax, Sleep Apnea, TB


Other Respiratory History: COPD and mild pulmonary fibrosis by chest x-ray with 

no current nebulizer therapy. Severe pneumonia on 3/13/17 with secondary sepsis 

and septic shock.


Gastrointestinal History: Reports: Cholelithiasis, Chronic Diarrhea, Colon Polyp

, Diverticulosis, Pancreatitis, Other (See Below).  Denies: Bowel Obstruction, 

Celiac Disease, Chronic Constipation, Gastritis, GERD, GI Bleed, Hepatitis, 

Hiatal Hernia, Inflammatory Bowel Disease, Irritable Bowel Syndrome, Jaundice


Other Gastrointestinal History: Nonsymptomatic cholelithiasis with no surgery 

required diagnosed by CT scan on 12, pancreatitis with mild secondary ileus 

on 12, excision of 3 benign hyperplastic polyps from the rectosigmoid 

region a colonoscopy on 16, sigmoid diverticulosis


Genitourinary History: Reports: Acute Renal Failure, BPH, Chronic Renal 

Insuffiency, Diabetic Nephropathy, Urinary Incontinence, UTI, Recurrent, Other (

See Below)


Other Genitourinary History: Bladder diverticulum with recurrent UTIs initially 

diagnosed on 6/3/02, mild proteinuria secondary to his diabetes, erectile 

dysfunction, left-sided hydrocele, urosepsis in the . Acute renal failure 

on top of previous chronic renal insufficiency at time of sepsis in 2017 

as above.


Musculoskeletal History: Reports: Arthritis, Back Pain, Chronic, Fracture, Gout

, Neck Pain, Chronic, Osteoarthritis, Osteoporosis, RA, Other (See Below).  

Denies: Amputation, SLE


Other Musculoskeletal History: Positive ISAAC on 02 with no history of lupus

; polymyalgia rheumatica, bilateral pes planus, clavicular fracture at age 10

side unknown. Mild scoliosis.


Neurological History: Reports: Neuropathy, Diabetic, Neuropathy, Peripheral.  

Denies: Cerebral Aneurysms, Concussion, CVA, Headaches, Chronic, Head Trauma, 

Migraines, MS, Parkinson's, Seizure, TIA


Psychiatric History: Reports: None.  Denies: Abuse, Victim of, ADD, ADHD, 

Addiction, Anxiety, Depression, Psych Hospitalization(s), Psychosis, PTSD, 

Suicide Attempt, Suicidal Ideation


Endocrine/Metabolic History: Reports: Diabetes, Type II, Osteoporosis, Other (

See Below).  Denies: Diabetes, Type I, Diabetes Mellitus, Type 3c, 

Hypothyroidism, IDDM


Other Endocrine/Metabolic History: Gynecomastia. Hypoalbuminemia. 

Hypomagnesemia.


Hematologic History: Reports: Anemia, B12 Deficiency, Blood Transfusion(s), 

Other (See Below).  Denies: Iron Deficiency


Other Hematologic History: transfusion of 2 units on 04 postoperative 

from total knee arthroplasty as below


Immunologic History: Reports: None.  Denies: AIDS, SLE


Oncologic (Cancer) History: Reports: Bladder, Other (See Below).  Denies: Basal 

Cell Carcinoma, Colon, Hodgkin's Lymphoma, Leukemia, Lymphoma, Malignant 

Melanoma, Metastatic, Non-Hodgkin's Lymphoma, Prostate, Squamous Cell Carcinoma


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


Dermatologic History: Reports: None.  Denies: Eczema, Psoriasis





- Infectious Disease History


Infectious Disease History: Reports: Chicken Pox.  Denies: C-Difficile, Measles

, Meningitis, Mononucleosis, MRSA, Mumps, Pertussis (Whooping Cough), Rubella, 

Scarlet Fever, Shingles, TB, VRE





- Past Surgical History


Head Surgeries/Procedures: Reports: None


HEENT Surgical History: Reports: Adenoidectomy, Cataract Surgery, Eye Surgery, 

Oral Surgery, Tonsillectomy, Other (See Below).  Denies: Laser Surgery, LASIK, 

Myringotomy w Tube(s), Naso-Sinus Surgery


Other HEENT Surgeries/Procedures: Right-sided cataract surgery on 17. Left-

sided cataract surgery on 3/13/17. Tonsillectomy and adenoidectomy in 1949. 

Partridge teeth extraction 4 in 1952. Additional multiple teeth extractions.


Cardiovascular Surgical History: Reports: None.  Denies: Varicose


Respiratory Surgical History: Reports: None.  Denies: Thoracentesis


GI Surgical History: Reports: Colonoscopy, Hernia, Inguinal, Polypectomy, Other 

(See Below).  Denies: Appendectomy, Cholecystectomy, EGD, Hernia, Abdominal, 

Hernia Repair/Other


Other GI Surgeries/Procedures: Last colonoscopy on 3/27/12 with previous 

colonoscopy and polypectomy as above on 06. Right inguinal hernia repair 

on 12.


Male  Surgical History: Reports: TUIP, Other (See Below).  Denies: 

Circumcision, Vasectomy


Other Male  Surgeries/Procedures: Right testicular orchiectomy for benign 

disease in . A left hydrocele repair on 05. TUIP with fulguration of 

bladder diverticulum on 02.


Endocrine Surgical History: Reports: None.  Denies: Thyroid Biopsy


Neurological Surgical History: Reports: None.  Denies: C-Spine, Discectomy, 

Laminectomy, Lumbar Spine, Sacral Spine, Spinal Fusion, Thoracic Spine, 

Vertebroplasty


Musculoskeletal Surgical History: Reports: Joint Replacement, Knee Replacement, 

Shoulder Surgery, Other (See Below).  Denies: Arthroscopic Procedure, Carpal 

Tunnel, Ganglion Cyst, ORIF


Other Musculoskeletal Surgeries/Procedures:: Bilateral total knee arthroplasty 

on 12/10/04. Right rotator cuff repair on 01.


Oncologic Surgical History: Reports: None


Dermatological Surgical History: Reports: None





- Past Imaging History


Past Imaging History: Reports: Angiography (Negative heart catheterization on 02.), Cardiac Echo (02), Carotid US (07), CAT Scan (CT scan of the 

abdomen and pelvis on 18 and 12. CT of the brain on 11.), DEXA 

Scan (Last DEXA scan on 13 with previous evaluation on 11.), Stress 

Testing (Borderline positive Cardiolite stress test on 18 with borderline 

septal ischemia and stable inferior wall fixed defect with ejection fraction of 

64% and no further heart catheterization, etc.), Ultrasound (Abdominal 

ultrasound on 18. Abdominal aortic ultrasound on 3/14/17 and 07. 

Bladder ultrasound on 6/3/02.), Venous Doppler (Legs bilaterally on 3/14/17.)





Social & Family History





- Family History


HEENT: Reports: None.  Denies: Glaucoma, Macular Degeneration, Retinal 

Detachment


Cardiac: Reports: CAD, MI, Other (See Below).  Denies: Afib, Aneurysm, 

Arrhythmia, Bypass, Cardiomyopathy, Heart Failure, High Cholesterol, 

Hypertension, Stent, Syncope


Other Cardiac Family History: Father with possible fatal MI in his 70s, mother 

with possible fatal MI in her 70s


Respiratory: Reports: None.  Denies: Asthma, COPD, PE, Pneumothorax, Sleep Apnea


GI: Reports: None.  Denies: Celiac Disease, Cholelithiasis, Colon Polyps, GERD, 

GI bleed, Hepatitis, Inflammatory Bowel Disease, Irritable Bowel Syndrome, 

Pancreatitis, PUD


: Reports: None.  Denies: Renal Calculus, Renal Disease/Insufficiency


OBGYN: Reports: None.  Denies: Endometriosis, Recurrent Spontaneous 


Musculoskeletal: Reports: Arthritis, Osteoarthritis, Other (See Below).  Denies

: Gout, RA, SLE


Other Musculoskeletal Family History: Father, 2 paternal aunts with arthritis.


Neurological: Reports: None


Psychiatric: Reports: None.  Denies: Abuse, Victim of, ADD, ADHD, Anxiety, 

Depression, Psych Hospitalization(s), PTSD, Suicide Attempt


Endocrine/Metabolic: Reports: Diabetes, type II, Other (See Below).  Denies: 

Diabetes, Type I, Diabetes Mellitus, Type 3c, Hypothyroidism, IDDM


Other Endocrine/Metabolic Family History: Father with possible diabetes mellitus


Hematologic: Reports: None.  Denies: Anemia


Immunologic: Reports: None.  Denies: AIDS, HIV


Dermatologic: Reports: None.  Denies: Eczema, Psoriasis


Oncologic: Reports: Prostate, Other (See Below).  Denies: Bladder, Colon, 

Hodgkin's Lymphoma, Leukemia, Lung, Lymphoma, Metastatic, Non-Hodgkin's Lymphoma

, Pancreatic, Skin


Other Oncologic Family History: Father with possible prostate cancer





- Caffeine Use


Caffeine Use: Reports: Coffee (2 cups of coffee per day).  Denies: Energy Drinks

, Soda, Tea


Caffeine Use Comment: 1.5 cups per day





- Living Situation & Occupation


Living situation: Reports:  (1957, 2 children)


Occupation: Retired (Semiretired farmer and  toy tractor collecter)





H&P Review of Systems





- Review of Systems:


Review Of Systems: See Below


General: Reports: No Symptoms


HEENT: Reports: No Symptoms


Pulmonary: Reports: No Symptoms


Cardiovascular: Reports: No Symptoms


Gastrointestinal: Reports: No Symptoms


Genitourinary: Reports: No Symptoms


Musculoskeletal: Reports: Leg Pain


Skin: Reports: Wound (Incision intact)


Psychiatric: Reports: No Symptoms


Neurological: Reports: No Symptoms


Hematologic/Lymphatic: Reports: No Symptoms


Immunologic: Reports: No Symptoms





Exam





- Exam


Exam: See Below





- Exam


General: Alert, Oriented, 4


HEENT: PERRLA, Hearing Intact, Mucosa Moist & Pink, Nares Patent, Normal Nasal 

Septum, Posterior Pharynx Clear, Conjunctiva Clear, EOMI, EACs Clear, TMs Clear


Neck: Supple, Trachea Midline, 2


Lungs: Clear to Auscultation, Normal Respiratory Effort


Cardiovascular: Regular Rate, Regular Rhythm


GI/Abdominal Exam: Normal Bowel Sounds, Soft, Non-Tender, No Organomegaly, No 

Distention, No Abnormal Bruit, No Mass, Pelvis Stable


 (Male) Exam: Deferred


Rectal (Males) Exam: Deferred


Back Exam: Normal Inspection, Full Range of Motion, NT


Extremities: Leg Pain, Limited Range of Motion


Skin: Intact, Incision


Neurological: Cranial Nerves Intact, Reflexes Equal Bilateral


Neuro Extensive - Mental Status: Alert, Oriented x3, Normal Mood/Affect, Normal 

Cognition


Neuro Extensive - Motor, Sensory, Reflexes: CN II-XII Intact, Normal Gait, 

Normal Reflexes


Psychiatric: Alert, Normal Affect, Normal Mood





- Problem List


(1) Left femoral shaft fracture


SNOMED Code(s): 10108672


   ICD Code: S72.302A - UNSP FRACTURE OF SHAFT OF LEFT FEMUR, INIT FOR CLOS FX 

  Status: Acute   Current Visit: Yes   Problem Details: Patient here for 

physical therapy and occupational therapyrelated to fall on 19   


Qualifiers: 


   Encounter type: initial encounter   Fracture type: closed   Fracture 

morphology: other fracture   Qualified Code(s): S72.392A - Other fracture of 

shaft of left femur, initial encounter for closed fracture   





(2) Hypertension


SNOMED Code(s): 76568022


   ICD Code: I10 - ESSENTIAL (PRIMARY) HYPERTENSION   Status: Acute   Current 

Visit: Yes   


Qualifiers: 


   Hypertension type: unspecified   Qualified Code(s): I10 - Essential (primary

) hypertension   





(3) Diabetes mellitus


SNOMED Code(s): 43888741


   ICD Code: E11.9 - TYPE 2 DIABETES MELLITUS WITHOUT COMPLICATIONS   Status: 

Chronic   Priority: Medium   Current Visit: No   Problem Details:  Accu-Cheks 3 

times per week, we will monitor   


Qualifiers: 


   Diabetes mellitus type: type 2   Diabetes mellitus long term insulin use: 

without long term use   Diabetes mellitus complication status: without 

complication   Qualified Code(s): E11.9 - Type 2 diabetes mellitus without 

complications   


Problem List Initiated/Reviewed/Updated: Yes


Orders Last 24hrs: 


 Active Orders 24 hr











 Category Date Time Status


 


 Patient Status [ADT] Routine ADT  19 14:35 Ordered


 


 Ambulate [RC] ASDIRECTED Care  19 14:35 Ordered


 


 Antiembolic Devices [RC] PER UNIT ROUTINE Care  19 14:39 Ordered


 


 Bedrest Bathroom Privileges [RC] ASDIRECTED Care  19 14:35 Ordered


 


 Blood Glucose Check, Bedside [RC] MoWeFr@ Care  19 14:35 Ordered


 


 Diabetes Education [RC] Click to Edit Care  19 14:37 Ordered


 


 May Shower [RC] ASDIRECTED Care  19 14:35 Ordered


 


 Notify Provider Vital Signs [RC] ASDIRECTED Care  19 14:36 Ordered


 


 Oxygen Therapy [RC] PRN Care  19 14:35 Ordered


 


 Up With Assistance [RC] ASDIRECTED Care  19 14:35 Ordered


 


 Up to Chair [RC] ASDIRECTED Care  19 14:35 Ordered


 


 VTE/DVT Education [RC] PER UNIT ROUTINE Care  19 14:35 Ordered


 


 Vital Signs [RC] PER UNIT ROUTINE Care  19 14:35 Ordered


 


 OT Evaluation and Treatment [CONS] Routine Cons  19 14:35 Ordered


 


 PT Evaluation and Treatment [CONS] Routine Cons  19 14:35 Ordered


 


 American Diabetic Association Diet [DIET] Diet  19 Dinner Ordered


 


 Acetaminophen [Tylenol Extra Strength] Med  19 14:47 Ordered





 1,000 mg PO Q6HR PRN   


 


 Acetaminophen/HYDROcodone [Norco 325-5 MG] Med  19 14:47 Ordered





 1 each PO Q4HR PRN   


 


 Aspirin Med  19 08:00 Ordered





 325 mg PO DAILY   


 


 Carvedilol [Coreg] Med  19 18:00 Ordered





 12.5 mg PO BID   


 


 Cholecalciferol (Vitamin D3) [Vitamin D3] Med  19 08:00 Ordered





 1,000 units PO DAILY   


 


 Cyanocobalamin (Vitamin B12) [Vitamin B12] Med  19 08:00 Ordered





 1,000 mcg PO DAILY   


 


 Enoxaparin [Lovenox] Med  19 08:00 Ordered





 40 mg SUBCUT DAILY   


 


 Enoxaparin [Lovenox] Med  19 08:00 Stop Req





 40 mg SUBCUT DAILY   


 


 Latanoprost [Xalatan 0.005% Ophth Soln] Med  19 20:00 Ordered





 1 drop EYEBOTH BEDTIME   


 


 Lisinopril [Prinivil] Med  19 08:00 Ordered





 5 mg PO DAILY   


 


 Magnesium Oxide/Mag AA Chelate [Magnesium] Med  19 08:00 Ordered





 300 mg PO DAILY   


 


 Milk Thistle Seed Extract [Milk Thistle] Med  19 08:00 Ordered





 100 mg PO DAILY   


 


 Non-Formulary Medication [NF Drug] Med  19 08:00 Ordered





 1 each PO DAILY   


 


 Ubidecarenone [Coenzyme Q10] Med  19 08:00 Ordered





 100 mg PO DAILY   


 


 metFORMIN HCl [Metformin HCl] Med  19 18:00 Ordered





 1,000 mg PO BID   


 


 Antiembolic Hose [OM.PC] Routine Oth  19 14:35 Ordered


 


 Glucose Management Sub Q Reflex [OM.PC] Click To Edit Oth  19 14:35 

Ordered


 


 Resuscitation Status Routine Resus Stat  19 14:35 Ordered








 Medication Orders





Acetaminophen (Tylenol Extra Strength)  1,000 mg PO Q6HR PRN


   PRN Reason: Pain


Hydrocodone Bitart/Acetaminophen (Norco 325-5 Mg)   tab PO Q4HR PRN


   PRN Reason: Pain (severe 7-10)

## 2019-07-17 RX ADMIN — VITAMIN D, TAB 1000IU (100/BT) SCH MCG: 25 TAB at 07:25

## 2019-07-17 RX ADMIN — CYANOCOBALAMIN TAB 1000 MCG SCH MCG: 1000 TAB at 07:25

## 2019-07-17 RX ADMIN — HYDROCODONE BITATRATE AND ACETAMINOPHEN PRN TAB: 5; 325 TABLET ORAL at 09:53

## 2019-07-17 RX ADMIN — HYDROCODONE BITATRATE AND ACETAMINOPHEN PRN TAB: 5; 325 TABLET ORAL at 20:38

## 2019-07-17 NOTE — XMSREPORT
Discharge Summary

 Created on:2019



Patient:Awais Olivarez

Sex:Male

:1934

External Reference #:[id number]





Demographics







 Address  [address] [address] [address]

 

 Home Phone  [phone]

 

 Mobile Phone  [phone]

 

 Preferred Language  English

 

 Marital Status  Unknown

 

 Yarsanism Affiliation  Unknown

 

 Race  White

 

 Ethnic Group  Not  or 









Author







 Organization  [facility]

 

 Address  [address] [place] [address] [address]



   [place] [address]









Support







 Name  Relationship  Address  Phone

 

 Mey Olivarez  Spouse  [address] [facility] [address]  [phone]



     [place] [address]  

 

 Renny Olivarez  Child  Unavailable  [phone]









Care Team Providers







 Name  Role  Phone

 

 Jackie Horvath JEZ-CNP  Primary Care Provider  [phone]

 

 Provider, No Attributed RESOURCE  Attributed Provider  Unavailable









Reason for Visit







 Reason  Comments

 

   



Auth/Cert





 Status  Reason  Specialty  Diagnoses / Procedures  Referred By Contact  
Referred To Contact

 

           









Encounter Details







 Date  Type  Department  Care Team  Description

 

 2019 -  Hospital Encounter  [facility]  Provider, [facility] 
Procedure  Femur fracture



 2019    [address]



  



Keyla Cordova MD



[address] [place] [address]



[phone] (Fax)  ([facility])



     [address]  



Mecca Beck MD



[address] [place] [address]



[phone] (Fax)  



     [facility]



  



Tacos Riojas MD



[address] [place] [address]



[phone] (Fax)  



     [place] [address]



  



Cheryl Voss MD



[address] [place] [address]



[phone] (Fax)  



     [phone]    







Allergies

No Known Allergiesdocumented as of this encounter (statuses as of 2019)



Medications







 Medication  Sig  Dispensed  Refills  Start  End Date  Status



         Date    

 

 metFORMIN  Take 1,000 mg by    0      Active



 (GLUCOPHAGE) 500  mouth 2 times a          



 MG tablet  day          

 

 aspirin 325 mg  Take 325 mg by    0      Active



 tablet  mouth 1 time per          



   day.          

 

 acetaminophen  Take 1,000 mg by    0      Active



 (TYLENOL) 500 mg  mouth every 6          



 tablet  hours as needed          



   for mild pain          

 

 lisinopril  Take 5 mg by mouth    0      Active



 (PRINIVIL,  1 time per day          



 ZESTRIL) 5 mg            



 tablet            

 

 carVEDilol (COREG)  Take 12.5 mg by    0  04/10/201    Active



 25 mg tablet  mouth 2 times a      5    



   day          

 

 Ginkgo 60 MG TABS  Take 60 mg by    0      Active



   mouth 1 time per          



   day          

 

 latanoprost  Place 1 drop into    0      Active



 (XALATAN) 0.005 %  both eyes every          



 ophthalmic  night at bedtime          



 solution            

 

 ubiquinone q-10  Take 100 mg by    0      Active



 (CO-ENZYME Q-10)  mouth 1 time per          



 100 MG CAPS  day          

 

 magnesium 300 MG  Take 300 mg by    0      Active



 CAPS  mouth 1 time per          



   day          

 

 MILK THISTLE PO  Take 100 mg by    0      Active



   mouth 1 time per          



   day          

 

 cyanocobalamin  Take 1,000 mcg by    0      Active



 (VITAMIN B-12)  mouth 1 time per          



 1000 mcg tablet  day          

 

 vitamin D3,  Take 1,000 Units    0      Active



 cholecalciferol,  by mouth 1 time          



 1000 units tablet  per day          

 

 HYDROcodone-acetam  Take 1 tablet by  30 tablet  0      Active



 inophen (NORCO)  mouth Every 4      9    



 5-325 mg  hours as needed          



 tabletIndications:  for severe pain          



 Closed fracture of            



 left hip with            



 routine healing,            



 subsequent            



 encounter            

 

 enoxaparin  Inject 40 mg  24 syringe  0  07/17/201  08/10/20  Active



 (LOVENOX) 40 mg  subcutaneously 1      9  19  



 syringe (100  time per day for          



 mg/mL)  24 days          



 subcutaneous            



 injection            



 solutionIndication            



 s: Closed fracture            



 of left hip with            



 routine healing,            



 subsequent            



 encounter            

 

 prednisoLONE  1 drop in left eye    0    20  Discontinued



 acetate (PRED  4 times a day from        19  



 FORTE) 1 %  3-13-17 through          



 ophthalmic  3-26-17. Then 1          



 suspension  drop in left eye 3          



   times a day from          



   3-27-17 through          



   17. Then 1          



   drop in left eye 2          



   times a day from          



   4-3-17 through          



   17 then done.          

 

 prednisoLONE  1 drop in right    0    20  Discontinued



 acetate (PRED  eye 3 times a day        19  



 FORTE) 1 %  from 3-14-17          



 ophthalmic  through 3-19-17.          



 suspension  Then 1 drop in          



   right eye 2 times          



   a day from 3-20-17          



   through 3-26-17          



   then done.          



documented as of this encounter (statuses as of 2019)



Active Problems







 Problem  Noted Date

 

 Postoperative anemia due to acute blood loss  2019

 

 Femur fracture  2019

 

 Acute on chronic kidney failure  2017

 

 Osteoporosis  2013









 Overview: 



Overview:



 DEXA 2013: T-score -0.6 lumbar spine decreased 5.4% as compared to 2011 baseline.  T-score -2.7 left hip.









 Hypertension  04/15/2013

 

 Encounter for long-term (current) use of other medications  2012

 

 Rheumatoid arthritis(714.0)  2011

 

 Diverticulosis of large intestine  2009

 

 Hypertrophy of prostate without urinary obstruction and other lower  2009



 urinary tract symptoms (LUTS)  

 

 Controlled diabetes mellitus type II without complication  2009



documented as of this encounter (statuses as of 2019)



Resolved Problems







 Problem  Noted Date  Resolved Date

 

 Septic shock  2017

 

 Pneumonia  2017

 

 Severe sepsis  2017



documented as of this encounter (statuses as of 2019)



Immunizations







 Name  Dates Previously Given  Next Due

 

 FLU VACCINE HIGH DOSE 65YR+  10/01/2016  



documented as of this encounter



Social History







 Tobacco Use  Types  Packs/Day  Years Used  Date

 

 Former Smoker  Cigarettes      









 Smokeless Tobacco: Former User      Quit: 1960









 Alcohol Use  Drinks/Week  oz/Week  Comments

 

 No      









 Sexually Active  Birth Control  Partners  Comments

 

 Never      









 Sex Assigned at Birth  Date Recorded

 

 Not on file  









 Job Start Date  Occupation  Industry

 

 Not on file  Not on file  Not on file









 Travel History  Travel Start  Travel End









 No recent travel history available.



documented as of this encounter



Last Filed Vital Signs







 Vital Sign  Reading  Time Taken

 

 Blood Pressure  136/79  2019  8:55 AM CDT

 

 Pulse  106  2019  8:55 AM CDT

 

 Temperature  36.8 C (98.2 F)  2019  8:55 AM CDT

 

 Respiratory Rate  18  2019  8:55 AM CDT

 

 Oxygen Saturation  100%  2019  8:55 AM CDT

 

 Inhaled Oxygen Concentration  -  -

 

 Weight  94.2 kg (207 lb 9.6 oz)  2019  6:00 PM CDT

 

 Height  182.9 cm (6')  2019  6:00 PM CDT

 

 Body Mass Index  28.16  2019  6:00 PM CDT



documented in this encounter



Functional Status







 Functional Status  Response  Date of Assessment

 

 Is the person deaf or does he/she have serious difficulty  No  2019



 hearing?    

 

 Is this person blind or does he/she have difficulty  No  2019



 seeing even when wearing glasses?    

 

 Do you have difficulty with walking, balance, climbing  No  2019



 stairs, or had a fall in the last 3 months?    

 

 Does the patient have difficulty dressing or bathing?  No  2019

 

 Because of a physical, mental, or emotional condition;  No  2019



 does this person have difficulty doing errands alone such    



 as visiting a doctor's office or shopping?    









 Cognitive Status  Response  Date of Assessment

 

 Because of a physical, mental, or emotional condition;  No  2019



 does this person have serious difficulty concentrating,    



 remembering, or making decisions?    



documented as of this encounter



Discharge Summaries

Cheryl Voss MD - 2019 10:50 AM CDTFormatting of this note might be 
different from the original.

Hospital Discharge Summary



Attending Physician: Cheryl Voss MD

Attending Physician Specialty: Adult Hospitalist

Admit Date: 2019

Discharge Date: 19

Primary Care Physician: GET Ochoa



Discharge Diagnoses

Principal Problem:

  Femur fracture (HCC)

Active Problems:

  Postoperative anemia due to acute blood loss

Resolved Problems:

  * No resolved hospital problems. *



Hospital Course



Mr. Olivarez is an 85 y/o M who fell on Friday,  as he went down a curb and 
fractured his L hip. His is s/p repair on . Postoperative course notable 
only for some acute blood loss anemia (no transfusion required) and mild 
hypotension that resolved. He did well with physical therapy. Pain control 
good. Good oral intake and bowel and bladder function. Discharged to swing bed 
to continue some intensive rehab prior to return home.



LabTests Pending at Discharge



Unresulted Lab Orders (From admission, onward)

 Start     Ordered

 19 0432  HEMOGLOBIN  Early AM draw,   Routine  &amp;nbsp;

Start: 19 0432  End: 19 04:32 AM

 07/15/19 1600





Follow-Up Scheduled

Contact information for follow-up



 [facility], [facility], [address] [address]

Phone:  [phone]



 Next Steps:  Follow up

 Instructions:  Staff will provide services and therapies as needed.

Facility provider to follow up with patient.

 Pascale Akbar PA-C

Specialty:  [place] - Sports Medicine

 [facility]

[address] [place] [address]

Phone:  [phone]



 Next Steps:  Follow up

 Instructions:  Bone health appointment on , at 1:00 PM.

 Andrez Chao MD

Specialty:  Orthopedic Surgery

 [facility]

[address] [place] [address]

Phone:  [phone]



 Next Steps:  Follow up

 Instructions:  Follow up appointment on , at 2:00 PM.







Preliminary Discharge Medications

This list of medications is preliminary and tentative.  Please see the After 
Visit Summary for the final and accurate medication list.





Discharge Medication List



START taking these medications

START:  enoxaparin 40 mg syringe (100 mg/mL) subcutaneous injection solution

Commonly known as:  LOVENOX

Dose:  40 mg

Inject 40 mg subcutaneously 1 time per day for 24 days

Start taking on:  2019



START:  HYDROcodone-acetaminophen 5-325 mg tablet

Commonly known as:  NORCO

Dose:  1 tablet

Take 1 tablet by mouth Every 4 hours as needed for severe pain





CONTINUE taking these medications which have NOT CHANGED

CONTINUE:  acetaminophen 500 mg tablet

Commonly known as:  TYLENOL

Dose:  1000 mg

Take 1,000 mg by mouth every 6 hours as needed for mild pain



CONTINUE:  aspirin 325 mg tablet

Dose:  325 mg

Take 325 mg by mouth 1 time per day.



CONTINUE:  carVEDilol 25 mg tablet

Commonly known as:  COREG

Dose:  12.5 mg

Take 12.5 mg by mouth 2 times a day



CONTINUE:  cyanocobalamin 1000 mcg tablet

Commonly known as:  vitamin B-12

Dose:  1000 mcg

Take 1,000 mcg by mouth 1 time per day



CONTINUE:  Ginkgo 60 MG Tabs

Dose:  60 mg

Take 60 mg by mouth 1 time per day



CONTINUE:  latanoprost 0.005 % ophthalmic solution

Commonly known as:  XALATAN

Dose:  1 drop

Place 1 drop into both eyes every night at bedtime



CONTINUE:  lisinopril 5 mg tablet

Commonly known as:  PRINIVIL, ZESTRIL

Dose:  5 mg

Take 5 mg by mouth 1 time per day



CONTINUE:  magnesium 300 MG Caps

Dose:  300 mg

Take 300 mg by mouth 1 time per day



CONTINUE:  metFORMIN 500 MG tablet

Commonly known as:  GLUCOPHAGE

Dose:  1000 mg

Take 1,000 mg by mouth 2 times a day



CONTINUE:  MILK THISTLE PO

Dose:  100 mg

Take 100 mg by mouth 1 time per day



CONTINUE:  ubiquinone q-10 100 MG Caps

Commonly known as:  CO-ENZYME Q-10

Dose:  100 mg

Take 100 mg by mouth 1 time per day



CONTINUE:  vitamin D3 (cholecalciferol) 1000 units tablet

Dose:  1000 Units

Take 1,000 Units by mouth 1 time per day





 You might also be taking other medications not listed above. If you have 
questions about any of your other medications, talk to the person who 
prescribed them or your Primary Care Provider.









Where to Get Your Medications



These medications were sent to [id number] [address], [place] [address]

 Phone:  [phone]

 enoxaparin 40 mg syringe (100 mg/mL) subcutaneous injection solution

 HYDROcodone-acetaminophen 5-325 mg tablet





Temp: 98.2 F (36.8 C) BP: 136/79 Weight: 94.2 kg (207 lb 9.6 oz) SpO2: 100 %

Resp: 18 Pulse: 106 Current BMI (&gt;50=increased risk): 28.15 O2 Device: Room 
Air

O2 Flow Rate (L/min): 0 l/min



Physical Exam

Constitutional: He is oriented to person, place, and time. He appears well-
developed and well-nourished. No distress.

HENT:

Head: Normocephalic and atraumatic.

Right Ear: External ear normal.

Left Ear: External ear normal.

Mouth/Throat: No oropharyngeal exudate.

Eyes: EOM are normal. No scleral icterus.

Neck: Neck supple. No tracheal deviation present.

Cardiovascular: Regular rhythm, normal heart sounds and intact distal pulses. 
Tachycardia present.

No murmur heard.

Pulmonary/Chest: Effort normal and breath sounds normal. No respiratory 
distress. He has no wheezes.He has no rales.

Abdominal: Soft. Bowel sounds are normal. He exhibits no distension. There is 
no tenderness. There is no rebound and no guarding.

Musculoskeletal: He exhibits no edema.

Neurological: He is alert and oriented to person, place, and time. No cranial 
nerve deficit.

Skin: Skin is warm and dry. No rash noted. He is not diaphoretic. No erythema.

Psychiatric: He has a normal mood and affect.

Nursing note and vitals reviewed.



Procedures Performed and Findings

All procedures during admission

Procedure(s):

Left Long Synthes TFNA

-------------------



Consultations Obtained

ORTHOPEDICS CONSULT

CASE MANAGEMENT CONSULT

BONE HEALTH REFERRAL



Discharge Disposition

ADULT Discharge Planning: Home (1, 2)



Instructions for after discharge

 Contact your doctor if you develop a temperature greater than 101 degrees

 Contact your doctor if you experience increased pain, numbness, or tingling

 Contact your doctor if you have any questions in the first week

 Contact your doctor if you notice any drainage from your incision after 48 
hours

 Contact your doctor if you notice any redness or swelling around your incision

 DO NOT BEGIN ANY EXERCISE PROGRAM UNTIL YOU RECEIVE APPROVAL FROM YOUR DOCTOR.
  WALKING IS A SAFE EXERCISE

 DO NOT OPERATE EQUIPMENT SUCH AS POWER TOOLS, MOWERS, SNOW BLOWERS

 ELEVATE EXTREMITY

 Elevate affected extremity as needed for swelling.

 ICE TO AFFECTED AREA

 Ice to affected area:  cold packs as needed for swelling and pain.

Assess skin every 2 hrs. Do not apply directly on skin.

 If you have questions or concerns, please call your orthopedic surgeon at the 
clinic

 MAY SHOWER - COVER INCISION WITH WATER PROOF DRESSING SO IT DOESN'T GET WET.

 NO DRIVING

 No Driving until follow up with your health care provider.

 NO TUB BATH UNTIL DIRECTED

 No use of alcohol or non-prescription drugs

 Notify provider with any questions or concerns

 Please call 911 and seek immediate emergency care if you experience any chest 
pain or shortness of breath or if you are coughing up blood

 Resume home diet

 Resume home diet

 Resume normal activity

 [place]KA STOCKING ON EVERY DAY

 1) May take stocking off at night

2) Wear stockings until follow-up appointment

 WALK FREQUENTLY AND GRADUALLY INCREASE THE DISTANCE YOU ARE WALKING

 WALK WITH ASSISTIVE DEVICE

 WEIGHT BEARING STATUS - AS TOLERATED





Medical Decision Making

A total of 25 minutes were spent on discharge coordination.



Electronically signed by Cheryl Voss MD at 2019 10:54 AM 
CDTdocumented in this encounter



Medications at Time of Discharge







 Medication  Sig  Dispensed  Refills  Start Date  End Date

 

 HYDROcodone-acetaminop  Take 1 tablet by mouth  30 tablet  0  2019  



 hen (NORCO) 5-325 mg  Every 4 hours as        



 tabletIndications:  needed for severe pain        



 Closed fracture of          



 left hip with routine          



 healing, subsequent          



 encounter          

 

 enoxaparin (LOVENOX)  Inject 40 mg  24 syringe  0  2019  08/10/2019



 40 mg syringe (100  subcutaneously 1 time        



 mg/mL) subcutaneous  per day for 24 days        



 injection          



 solutionIndications:          



 Closed fracture of          



 left hip with routine          



 healing, subsequent          



 encounter          

 

 lisinopril (PRINIVIL,  Take 5 mg by mouth 1    0    



 ZESTRIL) 5 mg tablet  time per day        

 

 carVEDilol (COREG) 25  Take 12.5 mg by mouth    0  04/10/2015  



 mg tablet  2 times a day        

 

 Ginkgo 60 MG TABS  Take 60 mg by mouth 1    0    



   time per day        

 

 latanoprost (XALATAN)  Place 1 drop into both    0    



 0.005 % ophthalmic  eyes every night at        



 solution  bedtime        

 

 ubiquinone q-10  Take 100 mg by mouth 1    0    



 (CO-ENZYME Q-10) 100  time per day        



 MG CAPS          

 

 magnesium 300 MG CAPS  Take 300 mg by mouth 1    0    



   time per day        

 

 MILK THISTLE PO  Take 100 mg by mouth 1    0    



   time per day        

 

 cyanocobalamin  Take 1,000 mcg by    0    



 (VITAMIN B-12) 1000  mouth 1 time per day        



 mcg tablet          

 

 vitamin D3,  Take 1,000 Units by    0    



 cholecalciferol, 1000  mouth 1 time per day        



 units tablet          

 

 acetaminophen  Take 1,000 mg by mouth    0    



 (TYLENOL) 500 mg  every 6 hours as        



 tablet  needed for mild pain        

 

 metFORMIN (GLUCOPHAGE)  Take 1,000 mg by mouth    0    



 500 MG tablet  2 times a day        

 

 aspirin 325 mg tablet  Take 325 mg by mouth 1    0    



   time per day.        



documented as of this encounter



Progress Notes

Alexandria Ernandez PA - 2019  9:35 AM CDTFormatting of this note might be 
different from the original.

 Orthopedic Progress Note



Awais Olivarez is a 84yr old male admitted on 2019  6:15 PM.



S: pain has been controlled, progressing with PT, up walking in the [place].  
Denies fever, chills.

{

Lab Results

Component Value Date

 HEMOGLOBIN 8.3 (L) 2019





O:

Vital Signs: Temp: 98.2 F (36.8 C) | BP: 136/79 | Pulse: 106 | Resp: 18 | 
Pain Ratin (out of10) | Weight: 94.2 kg (207 lb 9.6 oz) | O2 Device: Room 
Air

O2 Flow Rate (L/min): 0 l/min | SpO2: 100 %

Maximum Temperatures (last 24 hours)

 Temperature Maximum Max

 Temp  98.6 F (37 C)





Exam: alert

Bandages are dry, no drainage

Compartments are soft, no calf pain

Able to flex hip, DF/PF of ankle

Distal NVI



A:

3 Days Post-Op

Status Post:  Procedure(s):

Left Long Synthes TFNA



P: continue therapies, possible to [facility] today.  Lovenox for DVT 
prophylaxis



________________________________________________________________

Alexandria Ernandez PA-C



Electronically signed by Alexandria Ernandez PA at 2019  9:37 AM Tacos Epperson MD - 07/15/2019  3:16 PM CDTFormatting of this note might be different 
from the original.



DAILY PROGRESS NOTE



Awais Olivarez is a 84yr old male admitted on 2019  6:15 PM.



Impression / Plan

1. Left femoral fracture status post left long synthesis TFNA. Pain is 
controlled. Continue pain control. Therapy recommend placement.

2.Acute blood loss anemia. This is in setting of surgery. Continue to monitor. 
Transfusion parameterin place.

3.Essential Hypertension. BP better this morning. D/C IVF and hold BP 
medication for now.

4.DVT Prophylaxis. Lovenox

5.Code status. Full Code



Plan of care was DW patient. He verbalized understanding and agree with 
management plan. All questions answered.



Interval History

HPI



Pain is controlled with pain medication.

No fever overnight

Hb is around 8.5

Review of Systems

Review of Systems

Constitutional: Negative for chills and fever.

Respiratory: Negative for cough and shortness of breath.

Cardiovascular: Negative for chest pain and leg swelling.

Gastrointestinal: Negative for abdominal distention and abdominal pain.

Musculoskeletal: Positive for arthralgias and gait problem.

Neurological: Negative for dizziness and light-headedness.

Psychiatric/Behavioral: Negative for agitation and confusion.



Physical Exam

Vital Signs: Temp: 97.9 F (36.6 C) | BP: 127/64 | Pulse: 99 | Resp: 16 | 
Pain Ratin (out of 10) | Weight: 94.2 kg (207 lb 9.6 oz) | O2 Device: Room 
Air

O2 Flow Rate (L/min): 0 l/min | SpO2: 96 %

Maximum Temperatures (last 24 hours)

 Temperature Maximum Max

 Temp  98.8 F (37.1 C)





Intake and Output:  0700 - 07/15 0659

In: 981 [Oral:350]

Out: 1000 [Urine:1000]

Physical Exam

Constitutional: He is oriented to person, place, and time. No distress.

HENT:

Head: Normocephalic and atraumatic.

Eyes: Right eye exhibits no discharge. Left eye exhibits no discharge.

Neck: Normal range of motion. Neck supple.

Cardiovascular: Normal rate.

Pulmonary/Chest: Effort normal and breath sounds normal.

Abdominal: Soft. Bowel sounds are normal.

Neurological: He is alert and oriented to person, place, and time.

Skin: Skin is warm and dry. He is not diaphoretic.

Psychiatric: He has a normal mood and affect.



Labs



Labs (Last day)



 07/15/19 0637 - 07/15/19 0637

 CBC   07/15/19 0637

 CBC

 WBC 4.0-11.0 (K/uL) 9.0

 RBC 4.40-5.80 (M/uL) 2.83

 Hemoglobin 13.5-17.5 (g/dL) 8.5

 Hematocrit 40.0-50.0 (%) 25.7

 MCV 80.0-98.0 (fL) 90.8

 MCH 25.5-34.0 (pg) 30.0

 MCHC 31.5-36.5 (g/dL) 33.1

 RDW-CV 11.5-15.5 (%) 12.8

 RDW-SD 35.5-50.0 (fl) 41.9

 Platelet Count 140-400 (K/uL) 106

 MPV 8.5-12.0 (fL) 10.2





 07/15/19 0637 - 07/15/19 0637

 CHEMISTRY   07/15/19 0637

 CHEMISTRY

 Glucose  (mg/dL) 160

 Sodium 135-145 (meq/L) 138

 Potassium 3.5-5.3 (meq/L) 3.8

 Chloride  (meq/L) 107

 CO2 20-29 (meq/L) 23

 Anion Gap with K 6-20 (meq/L) 12

 BUN 6-22 (mg/dL) 36

 Creatinine 0.80-1.30 (mg/dL) 1.11

 BUN/Creatinine Ratio 10.0-25.0 32.4

 Calcium 8.5-10.5 (mg/dL) 8.6

 eGFR  &gt;=60 (mL/min/1.73m2) 76

 eGFR Non- &gt;=60 (mL/min/1.73m2) 63





 07/15/19 1046 - 19 1706

 GLUCOSE POINT OF CARE   07/15/19 1046 07/15/19 0549 19 2059 19 1706

 GLUCOSE POINT OF CARE

 Glucose POC  (mg/dL) 217 154 297 240





 07/15/19 0637 - 07/15/19 0637

 OTHER   07/15/19 0637

 OTHER

 Age  (Years) 84









Medical Decision making

Medical Decision MakingElectronically signed by Tacos Riojas MD at 07/15/2019
  3:23 PM Gris Root PA - 07/15/2019 10:32 AM CDTFormatting of this 
note might be different from the original.

ORTHOPAEDIC POST OPERATIVE PROGRESS NOTE



July 15, 2019



POD # 2

Left TFN

Patient of Dr. Chao



S: Awais Olivarez is a 84yr male recovering from surgery, denies CP, SOB, N/V,
  numbness/tingling.

PT/OT: going well, per patient. Ambulated in the [place] without difficulty this 
morning

Pain: denies any pain currently, minimal pain with ambulation



O:

Temp Readings from Last 1 Encounters:

07/15/19 97.9 F (36.6 C)



BP Readings from Last 1 Encounters:

07/15/19 109/72



Pulse Readings from Last 1 Encounters:

07/15/19 98



     Gen: Patient lying in bed, alert and orientated, no apparent distress, 
accompanied by daughter-in-law

     Incision: no drainage. Dressing C/D/I.

     left LEG:  Warm, well perfused.  No erythema. Homans negative, no calf 
pain. Compartments soft.EHL/ PF/DF intact. Sensation grossly intact



Lab Results

Component Value Date

 WBC 9.0 07/15/2019

 RBC 2.83 (L) 07/15/2019

 HEMOGLOBIN 8.5 (L) 07/15/2019

 HEMATOCRIT 25.7 (L) 07/15/2019

 MCV 90.8 07/15/2019

 MCH 30.0 07/15/2019

 MCHC 33.1 07/15/2019

 RDW 13.2 2014

 PLTCOUNT 106 (L) 07/15/2019

 NEUTROPCT 67.2 2017

 LYMPHSPCT 18.5 2017

 MONOSPCT 9.5 2017

 EOSPCT 4.2 2017

 BASOPHILPCT 0.3 2017



Lab Results

Component Value Date

 INR 1.1 (L) 2019



A/P:

1. s/p Left TFN:

 DVT prophylaxis: Lovenox x 28 days

 Pain control: continue same

 Wound care: change q 2-3 days or sooner PRN

 PT/OT: continue; WBAT

 Activity: as tolerated;

 Disposition/planning: continue cares; Per Ortho, clear for discharge to 
appropriate setting when available and medically stable.

 Follow up: 2 weeks with [facility] and 6 weeks with Dr. Zhanna Matthews. Acute Blood loss Anemia:  Expected

Electronically signed by Gris Gonzáles PA at 07/15/2019  3:55 PM CDMirella Yoo PA-C - 2019  2:05 PM CDTFormatting of this note might be 
different from the original.

ORTHOPAEDIC POST OPERATIVE PROGRESS NOTE



2019



1 Day Post-Op

Status Post:  Procedure(s):

Left Long Synthes TFNA



S: Awais Olivarez is a 84yr male is recovering from surgery.

denies of CP, SOB, N/V, Fever or Chills. Patient tolerating food and dizzy with 
PT today. Pain is controlled.



O: /68  | Pulse 101  | Temp 98.9 F (37.2 C)  | Resp 16  | Ht 182.9 cm 
(72")  | Wt 94.2 kg (207 lb 9.6 oz)  | SpO2 94%  | BMI 28.16 kg/m



Neurovascularly Intact left

Incision Clean Dry Intact with  no drainage.

left Calf nontender to palpation, left Thigh soft

negative Sree's sign

Intact Dorsiflexion and  Plantar flexion left



Lab Results

Component Value Date

 INR 1.1 (L) 2019



A/P:

1. Status Post:  Procedure(s):

Left Long Synthes TFNA:

 PT/OT: Out of bed, weight bearing status: WBAT Left lower

 DVT prophylaxis: lovenox

 Disposition/Planning: Changed diet to diabetic diet. Continue cares and pain 
control.

2. Acute Blood loss Anemia:  Expected- stable



Lab Results

Component Value Date

 HEMOGLOBIN 9.6 (L) 2019





Mirella Szymanski PA-C [facility]

Alpha Pager: [phone] signed by Mirella Szymanski PA-C at 2019  2:07 PM CDTSonia, MD Mecca - 2019 11:05 AM CDT

INTERVAL HISTORY: As of today, the patient's blood pressure was found to be low 
while he really started working with therapy. This afternoon it was reported, 
has been having some tachycardia on and off, but otherwise no significant 
events have been reported.



REVIEW OF SYSTEMS: Stated that he does have some hip pain, but tolerable. He 
does not have any nausea or vomiting. He does not feel lightheaded or dizzy, 
but he is in general worried about his blood pressure being low.



PHYSICAL EXAMINATION:

VITAL SIGNS: Reviewed. Blood pressure is running softer with 90s systolic. Rest 
of the lateral are relatively stable.

GENERAL: Age appropriate, resting in bed.

HEART: S1, S2, regular rate and rhythm.

LUNGS: Fair air entry bilaterally.

ABDOMEN: Soft, nontender, nondistended. Positive bowel sounds.

EXTREMITIES: No pitting edema.



LABS: Hemoglobin is down to 9.6 from 12. Blood sugars are within acceptable 
limits.



ASSESSMENT AND PLAN:

1. Left femur fracture, status post left long Synthes. Will continue with the 
bowel regimen and painregimen. Therapy will work with the patient.

2. Postoperative hypotension, probably related to then anesthesia and that may 
be an element of acute blood loss anemia as well. We will continue with IV 
fluid hydration. Discussed with nurse to take precaution when the patient is 
getting up, he should be getting up slowly. We will hold off on all theblood 
pressure medications.

3. History of hypertension. Hold off on all the antihypertensives for now.

4. Acute blood loss anemia, expected after the hip surgery. Continue to 
monitor. No need for transfusion for now, however, the patient does have a 
history of getting transfusions in the pas for operative procedures.

5. Deep venous thrombosis prophylaxis. The patient is maintained on Lovenox.



Job ID/Trans ID:  [id number]

Doc ID:  [id number]

D:  2019 11:21:14 CST

T:  2019 11:39:39 CST



Electronically signed by Mecca Beck MD at 2019  8:04 PM Mecca Alcocer MD - 2019 12:06 PM CDT

INTERVAL HISTORY: Overnight, no events reported.



REVIEW OF SYSTEMS: He continues to complain of left hip pain and muscle spasms. 
Denies shortness of breath. No chest pain. No nausea or vomiting. No shortness 
of breath.



PHYSICAL EXAMINATION:

VITAL SIGNS: Reviewed. The patient was tachycardic yesterday, but improved this 
morning, probably related to the pain, the rest of the vitals are stable.

GENERAL: Age appropriate, resting in bed.

HEART: S1, S2, a 2/6 systolic murmur.

LUNGS: Fair air entry bilaterally.

ABDOMEN: Soft, nontender, nondistended. Positive bowel sounds.

EXTREMITIES: No pitting edema.



LABS: Not obtained for today. Blood sugar is within acceptable limits.



PROBLEMS:

1. Left femur fracture. The plan is for operative repair today. Continue with 
pain regimen, meanwhile bowel regimen to be started post surgery.

2. History of hypertension. Continue with Coreg, holding off on lisinopril due 
to surgery. Blood pressure within acceptable limits.

3. Diabetes mellitus. Continue with insulin sliding scale. Blood sugars are 
within acceptable limits.

4. History of chronic normocytic anemia. Hemoglobin is stable. We will follow 
post surgery.

5. Deep venous thrombosis prophylaxis. Continue with SCDs for now. No chemical 
prophylaxis in anticipation of the procedure. When the patient is done with the 
procedure, will follow up with orthopedicsand will start the patient on deep 
venous thrombosis prophylaxis.



Details discussed with the patient, his wife, and son at bedside. All their 
concerns were answered.



Job ID/Trans ID:  [id number]

Doc ID:  [id number]

D:  2019 12:13:35 CST

T:  2019 12:30:07 CST



Electronically signed by Mecca Beck MD at 2019 11:02 AM CDTdocumented in 
this encounter



Plan of Treatment







 Date  Type  Specialty  Care Team  Description

 

 2019  Office Visit  Orthopedics  Pascale Akbar PA-C



  



       [address] [place] [address]



  



       [phone] (Fax)  

 

 2019  Office Visit  Orthopedics  Andrez Chao MD



  



       [address] [place] [address]



  



       [phone] (Fax)  









 Name  Priority  Associated Diagnoses  Order Schedule

 

 HEMOGLOBIN  Routine    Early AM draw for labs for 2 Days starting



       2019 until 2019, 1 completed



documented as of this encounter



Implants







 Implanted  Type  Area    Device  Shelf  Model /



         Identifier  Expiration  Serial /



           Date  Lot

 

 Nail Tfna Ti Lft 130d [id number] N [id number] Ea1 - [id number]  Ortho  Left:  J&
J DEPUY    2024  [id number] /



 Implanted: Qty: 1 on 2019 by Andrez Chao MD  Other  TROCHANTER  
SYNTHES       /



             [id number]

 

 Blade Fen Hel Tfna Ti 110mm N [id number] Ea1 - [id number]  Ortho  Left:  J&J 
DEPUY      04.038.410 /



 Implanted: Qty: 1 on 2019 by Andrez Chao MD  Other  TROCHANTER  
SYNTHES       /

 

 Screw Lk Ti Nail 5x42mm N 04.005.532 Ea1 - [id number]  Ortho  Left:  J[employer]J DEPUY
      [id number] /



 Implanted: Qty: 1 on 2019 by Andrez Chao MD  Other  TROCHANTER  
SYNTHES       /









 Explanted  Type  Area    Device  Shelf  Model /



         Identifier  Expiration  Serial /



           Date  Lot

 

 Gdwire [id number] N 357.399 Ea1 - [id number]  Ortho  Left:  J&J DEPUY      
357.399 /



 Explanted: Qty: 2 on 2019 by Andrez Chao MD  Other  TROCHANTER  
SYNTHES       /



documented as of this encounter



Procedures







 Procedure Name  Priority  Date/Time  Associated  Comments



       Diagnosis  

 

 GLUCOSE BY METER, POCT  Routine  2019 10:38    Results for this



     AM CDT    procedure are in



         the results



         section.

 

 GLUCOSE BY METER, POCT  Routine  2019  6:56    Results for this



     AM CDT    procedure are in



         the results



         section.

 

 COLLECT AND HOLD GREEN  Routine  2019  5:39    Results for this



 TOP TUBE    AM CDT    procedure are in



         the results



         section.

 

 HEMOGLOBIN  Routine  2019  5:39    Results for this



     AM CDT    procedure are in



         the results



         section.

 

 GLUCOSE BY METER, POCT  Routine  07/15/2019  9:34    Results for this



     PM CDT    procedure are in



         the results



         section.

 

 GLUCOSE BY METER, POCT  Routine  07/15/2019  4:48    Results for this



     PM CDT    procedure are in



         the results



         section.

 

 GLUCOSE BY METER, POCT  Routine  07/15/2019 10:46    Results for this



     AM CDT    procedure are in



         the results



         section.

 

 COMPLETE BLOOD COUNT  Routine  07/15/2019  6:37    Results for this



 WITHOUT DIFFERENTIAL    AM CDT    procedure are in



         the results



         section.

 

 BASIC METABOLIC PANEL  Routine  07/15/2019  6:37    Results for this



     AM CDT    procedure are in



         the results



         section.

 

 GLUCOSE BY METER, POCT  Routine  07/15/2019  5:49    Results for this



     AM CDT    procedure are in



         the results



         section.

 

 GLUCOSE BY METER, POCT  Routine  2019  8:59    Results for this



     PM CDT    procedure are in



         the results



         section.

 

 GLUCOSE BY METER, POCT  Routine  2019  5:06    Results for this



     PM CDT    procedure are in



         the results



         section.

 

 GLUCOSE BY METER, POCT  Routine  2019 11:14    Results for this



     AM CDT    procedure are in



         the results



         section.

 

 COLLECT AND HOLD GREEN  Routine  2019  6:46    Results for this



 TOP TUBE    AM CDT    procedure are in



         the results



         section.

 

 HEMOGLOBIN  Routine  2019  6:29    Results for this



     AM CDT    procedure are in



         the results



         section.

 

 GLUCOSE BY METER, POCT  Routine  2019  6:02    Results for this



     AM CDT    procedure are in



         the results



         section.

 

 GLUCOSE BY METER, POCT  Routine  2019  5:00    Results for this



     PM CDT    procedure are in



         the results



         section.

 

 XRAY C-ARM  Routine  2019  3:14    Results for this



     PM CDT    procedure are in



         the results



         section.

 

 GLUCOSE BY METER, POCT  Routine  2019 11:27    Results for this



     AM CDT    procedure are in



         the results



         section.

 

 NAILING INTRAMEDULLARY    2019 10:50  S/p left hip  



 FEMUR    AM CDT  fracture  









   Special Needs







   synthes Aguila aware









 GLUCOSE BY METER, POCT  Routine  2019  6:20 AM CDT    Results for this



         procedure are in the



         results section.

 

 PROTIME/INR  STAT  2019  8:56 PM CDT    Results for this



         procedure are in the



         results section.

 

 COMPLETE BLOOD COUNT WITHOUT  STAT  2019  8:56 PM CDT    Results for this



 DIFFERENTIAL        procedure are in the



         results section.

 

 COMPREHENSIVE METABOLIC  STAT  2019  8:56 PM CDT    Results for this



 PANEL        procedure are in the



         results section.



documented in this encounter



Results

GLUCOSE BY METER, POCT (2019 10:38 AM CDT)Only the most recent of13 
resultswithin the time period is included.





 Component  Value  Ref Range  Performed At  [facility] Signature

 

 Glucose POC  169 (H)  70 - 100 mg/dL  [facility]  









 Specimen

 

 Blood









 Performing Organization  Address  City/[facility]  Phone Number

 

 [facility]  [address] [place] [address] [phone]



COLLECT AND HOLD GREEN TOP TUBE (2019  5:39 AM CDT)Only the most recent 
of2 resultswithin the time period is included.





 Component  Value  Ref Range  Performed At  Pathologist



         Signature

 

 Collect and Hold  Comment: RECEIVED    [facility]  









 Specimen

 

 Blood









 Performing Organization  Address  City/[facility]  Phone Number

 

 [facility]  [address] [place] [address] [phone]



HEMOGLOBIN (2019  5:39 AM CDT)Only the most recent of2 resultswithin the 
time period is included.





 Component  Value  Ref Range  Performed At  [facility]

 

 Hemoglobin  8.3 (L)  13.5 - 17.5 g/dL  [facility]  









 Specimen

 

 Blood









 Performing Organization  Address  City/State/[facility]  Phone Number

 

 [facility]  [address] [place] [address] [phone]



BASIC METABOLIC PANEL (07/15/2019  6:37 AM CDT)





 Component  Value  Ref Range  Performed At  [facility]

 

 Glucose  160 (H)  70 - 100 mg/dL  [place] [address] [facility]  

 

 BUN  36 (H)  6 - 22 mg/dL  [place] [address] [facility]  

 

 Creatinine  1.11  0.80 - 1.30  [address]  



     mg/dL    

 

 BUN/Creatinine Ratio  32.4 (H)  10.0 - 25.0  [address]  

 

 Sodium  138  135 - 145 meq/L  [address]  

 

 Potassium  3.8  3.5 - 5.3 meq/L  [address]  

 

 Chloride  107  99 - 110 meq/L  [address]  

 

 CO2  23  20 - 29 meq/L  [address]  

 

 Anion Gap with K  12  6 - 20 meq/L  [address]  

 

 Calcium  8.6  8.5 - 10.5  [address]  



     mg/dL    

 

 Age  84  Years  [address]  

 

 eGFR Non-  63  >=60  [address]  



 American    mL/min/1.73m2    

 

 eGFR   76  >=60  [address]  



     mL/min/1.73m2    









 Specimen

 

 Blood









 Performing Organization  Address  City/[facility]  Phone Number

 

 [address]  [address], [place] [address]  



COMPLETE BLOOD COUNT WITHOUT DIFFERENTIAL (07/15/2019  6:37 AM CDT)Only the 
most recent of2 resultswithin the time period is included.





 Component  Value  Ref Range  Performed At  Pathologist Signature

 

 WBC  9.0  4.0 - 11.0 K/uL  [address]  

 

 RBC  2.83 (L)  4.40 - 5.80 M/uL  [address]  

 

 Hemoglobin  8.5 (L)  13.5 - 17.5 g/dL  [address]  

 

 Hematocrit  25.7 (L)  40.0 - 50.0 %  [address]  

 

 MCV  90.8  80.0 - 98.0 fL  [address]  

 

 MCH  30.0  25.5 - 34.0 pg  [address]  

 

 MCHC  33.1  31.5 - 36.5 g/dL  [address]  

 

 RDW-CV  12.8  11.5 - 15.5 %  [address]  

 

 RDW-SD  41.9  35.5 - 50.0 [address]  

 

 Platelet Count  106 (L)  140 - 400 K/uL  [address]  

 

 MPV  10.2  8.5 - 12.0 fL  [address]  









 Specimen

 

 Blood









 Performing Organization  Address  City/[facility]  Phone Number

 

 [address]  [address] [place] [address], [place] [address]  



XRAY C-ARM greater than one hour (2019  3:14 PM CDT)





 Specimen

 

 









 Narrative  Performed At

 

  



  



  Patient Name: AWAIS OLIVAREZ VAISHALI 



  



  YOB: 1934 



  



  Procedure: XRAY C-ARM 



  



  Date of Service: 2019 



  



  



  



  



  



 EXAM: XRAY C-ARM



  



  



  



 INDICATION:S/p left hip fracture [Z87.81] 



  



  



  



 IMPRESSION:



  



 Fluoroscopy was provided for . Multiple spot images were  



 submitted for archiving.



  



  



  



  



  



  



  



  



  



  



  



 Finalized by: Medardo Song MD on 2019 6:22 PM CDT



  



  



  



  Patient/Procedure Information:



  



  [facility] 



  



  MRN/KARIN: [id number] 



  



  Order Number: [id number] 



  



  Accession Number: [id number] 



  



  Ordering Provider: ANDREZ CHAO 



  



  Authorizing Provider: ANDREZ CHAO  









 Procedure Note

 

 Interface, Radiantres - 2019  7:21 PM CDT







  Patient Name:   AWAIS OLIVAREZ



  YOB: 1934



  Procedure: XRAY C-ARM



  Date of Service: 2019



 



 



 EXAM: XRAY C-ARM



 



 INDICATION:S/p left hip fracture [Z87.81]



 



 IMPRESSION:



 Fluoroscopy was provided for . Multiple spot images were submitted for 
archiving.



 



 



 



 



 



 Finalized by: Medardo Song MD on 2019 6:22 PM CDT



 



  Patient/Procedure Information:



  [facility]



  MRN/KARIN: [id number]



  Order Number: [id number]



  Accession Number: [id number]



  Ordering Provider: ANDREZ CHAO



  Authorizing Provider: ANDREZ CHAO









 Performing Organization  Address  City/State/Zipcode  Phone Number

 

       



PROTIME/INR (2019  8:56 PM CDT)





 Component  Value  Ref Range  Performed At  Pathologist Signature

 

 Protime  14.3  12.0 - 14.5 secs  [address]  

 

 INR  1.1 (L)  2.0 - 3.5  [address]  









 Specimen

 

 Blood









 Narrative  Performed At

 

 Normal INR reference range (patients not on oral  [address]



 anticoagulants)0.9-1.1.



  



 INR Standard Intensity=(2.0 - 3.0)



  



 INR Higher Intensity=(2.5 - 3.5)  









 Performing Organization  Address  City/State/Zipcode  Phone Number

 

 [address]  [address] [place] [address]  



COMPREHENSIVE METABOLIC PANEL (2019  8:56 PM CDT)





 Component  Value  Ref Range  Performed At  Pathologist Signature

 

 Glucose  115 (H)  70 - 100 mg/dL  [address]  

 

 BUN  18  6 - 22 mg/dL  [address]  

 

 Creatinine  0.96  0.80 - 1.30  [address]  



     mg/dL    

 

 BUN/Creatinine Ratio  18.8  10.0 - 25.0  [address]  

 

 Sodium  141  135 - 145 meq/L  [address]  

 

 Potassium  4.0  3.5 - 5.3 meq/L  [address]  

 

 Chloride  105  99 - 110 meq/L  [address]  

 

 CO2  27  20 - 29 meq/L  [address]  

 

 Anion Gap with K  13  6 - 20 meq/L  [address]  

 

 Calcium  9.5  8.5 - 10.5  [address]  



     mg/dL    

 

 Protein Total  6.4  6.0 - 8.2 g/dL  [address]  

 

 Albumin  4.0  3.5 - 5.0 g/dL  [address]  

 

 Alkaline Phosphatase  54  30 - 150 U/L  [address]  

 

 AST - SGOT  15  0 - 35 U/L  [address]  

 

 ALT - SGPT  13  0 - 55 U/L  [address]  

 

 Bilirubin Total  1.0  0.2 - 1.2 mg/dL  [address]  

 

 Age  84  Years  [address]  

 

 eGFR Non-  75  >=60  [address]  



 American    mL/min/1.73m2    

 

 eGFR   90  >=60  [address]  



     mL/min/1.73m2    









 Specimen

 

 Blood









 Performing Organization  Address  City/State/Zipcode  Phone Number

 

 [address]  [address] [place] [address], [place] [address]  



documented in this encounter



Visit Diagnoses







 Diagnosis

 

 Femur fracture (HCC) - Primary







 Closed fracture of unspecified part of femur

 

 Closed fracture of left hip with routine healing, subsequent encounter

 

 Postoperative anemia due to acute blood loss







 Acute posthemorrhagic anemia



documented in this encounter



Discharge Diagnoses

Not on filedocumented in this encounter



Administered Medications







 Medication Order  MAR Action  Action Date  Dose  Rate  Site









 acetaminophen (TYLENOL) tablet 650 mg



  



 650 mg, Oral, Every four hours prn, Starting 19 at 1825, Until  



 Discontinued, mild pain, fever, for pain Scale 3 or less, Pain stratification 
is  



 defined as follows for either analog scale (0-10) or critical care pain 
observation  



 tool (CPOT, 0-8).  a. No pain (0) b. Mild pain level (1-3) c. Moderate pain 
level  



 (4-6) d. Severe pain level (greater than or equal to 7),  

 

   

 

 carbohydrate 15 g



  



 15 g, Oral, PRN per parameter, Starting 19 at , Until Discontinued
,  



 low blood glucose, Give 15 grams of carbohydrate if blood glucose is less than 
70  



 mg/dL, patient is responsive and able to take food or oral meds.   Recheck 
blood  



 glucose in 15 minutes. Repeat 1 time and call MD.  If recheck is greater than 
70  



 mg/dL and able to take food or oral meds, give 15 gram carbohydrate if meal or
  



 snack due in more than an hour. Serve meal or snack if due in less than 1 
hour.  



 See hypoglycemia treatment on cardex.  Sources of 15 grams carbohydrate: a.4 
oz of  



 fruit juice or b.4 oz of soda pop (NOT diet) or c.1 tablespoon of honey,  

 

   









 carVEDilol (COREG) tablet 12.5 mg



  Given  2019 10:43 AM CDT  12.5 mg    



 12.5 mg, Oral, Two times a day with meals,          



 First dose on 19 at 0910, Until          



 Discontinued, Take with food. Hold for SBP          



 less than 90 Hold for HR less than 60 Take          



 with food.,          









   









 celecoxib (celeBREX) capsule 200 mg



  Given  2019  8:53 AM CDT  200 mg    



 200 mg, Oral, Two times a day, 10 doses,          



 First dose on Mon 7/15/19 at 2100, Last dose          



 on Sat 7/20/19 at 0900, Post - Op          









 Given  07/15/2019  9:25 PM CDT  200 mg    









   

 

 dextrose 50% IV solution 50 mL



  



 50 mL (25 g), IV, PRN per parameter, Starting 19 at , Until  



 Discontinued, low blood glucose, 50 mL, Give if blood glucose is less than 70  



 mg/dL, patient is unresponsive or NPO, and has IV access. Recheck blood 
glucose in  



 15 minutes and call MD. Repeat dose if recheck less than 70 mg/dL and call MD. 
If  



 recheck is greater than 70 mg/dL and able to take food or oral meds, give 15 
gram  



 carbohydrate if meal or snack due in more than an hour. Serve meal or snack if 
due  



 in less than 1 hour. See hypoglycemia treatment on cardex.,  

 

   

 

 dextrose chewable tablet 16 g



  



 16 g (4 tablet), Oral, PRN per parameter, Starting 19 at , Until  



 Discontinued, low blood glucose, Chew before swallowing. Give 15 gram of  



 carbohydrate if blood glucose is less than 70 mg/dL, patient is responsive and 
able  



 to take food or oral meds. Recheck blood glucose in 15 minutes. Repeat 15 gram
  



 carbohydrate if recheck is less than 70 mg/dL and call MD.  If recheck is 
greater  



 than 70 mg/dL and able to take food or oral meds, give 15 gram carbohydrate if 
meal  



 or snack due in more than an hour. Serve meal or snack if due in less than 1 
hour.  



 See hypoglycemia treatment on cardex. (Sources of 15 gram carbohydrate: 4 oz 
fruit  



 juice or 4 oz soda pop (NOT diet) or 1 tablespoonful honey or 4 glucose tablets
).,  

 

   









 enoxaparin (LOVENOX) subcutaneous injection  Given  2019  8:54 AM CDT  
40 mg    



 solution 40 mg



          



 40 mg, Subcutaneous, Daily, 28 doses, First          



 dose on Sun 19 at 0800, Last dose on Sat          



 8/10/19 at 0800, Post - Op, Begin 0800 on          



 first post op day. To avoid the loss of drug          



 when using the 30 mg and 40 mg prefilled          



 syringes, do not expel the air bubble from          



 the syringe before the injection.          



 Administration should be alternated between          



 the left and right anterolateral and left and          



 right posterolateral abdominal wall. The          



 whole length of the needle should be          



 introduced into a skin fold held between the          



 thumb and forefinger; the skin fold should be          



 held throughout the injection. To minimize          



 bruising, do not rub the injection site after          



 completion of the injection.,          









 Given  07/15/2019 10:37 AM CDT  40 mg    

 

 Given  2019  7:43 AM CDT  40 mg    









   

 

 glucagon for injection 1 mg vial 1 mg



  



 1 mg, Intramuscular, PRN per parameter, Starting 19 at , Until  



 Discontinued, low blood glucose, Give if blood glucose less than 70 mg/dL, 
patient  



 is unresponsive or NPO, and has no IV access.  Establish IV access. Recheck 
blood  



 glucose in 15 minutes and call MD.  If recheck is greater than 70 mg/dL and 
able to  



 take food or oral meds, give 15 gram carbohydrate if meal or snack due in more 
than  



 an hour. Serve meals or snack if due in less than 1 hour. See hypoglycemia  



 treatment on cardex.  Reconstitute vial with 1 mL of sterile water for 
injection  



 for reconstitution for a final concentration of 1 mg/mL.  Shake vial gently. 
Use  



 immediately and discard unused portion.  Reconstitute with 1 mL of sterile 
water  



 for injection to yield 1 mg/mL. Shake vial gently. Use immediately and discard
  



 unused portion.,  

 

   

 

 hydrALAZINE (APRESOLINE) injection solution 5 mg



  



 5 mg, IV, Every two hours prn, Starting Fri 19 at 2106, Until Discontinued
,  



 other (Specify), SBP > 180 or DBP > 100, 1 mL, Use second (or use first if HR 
<  



 60),  

 

   









 HYDROcodone-acetaminophen (NORCO) 5-325 mg  Given  2019 12:31 PM CDT  1 
tablet    



 tablet 1-2 tablet



          



 1-2 tablet, Oral, Every four hours prn,          



 Starting Sat 19 at 1641, Until          



 Discontinued, give 1 tablet for pain scale          



 1-5 and 2 tablets for pain scale 6-10,          



 Total dose of acetaminophen from all          



 acetaminophen containing products should          



 not exceed 4 grams (4000 mg) per day.,          









 Given  2019  8:40 AM CDT  1 tablet    

 

 Given  07/15/2019  7:18 PM CDT  1 tablet    









   









 insulin aspart (NovoLOG) SQ correction  Given  2019 10:43 AM CDT  2 
Units    



 scale (Adult)



          



 2-8 Units, Subcutaneous, Three times a day          



 with meals, First dose on Sat 19 at          



 0730, Until Discontinued, 0.08 mL,          



 **Patient is eating**  LOW DOSE insulin          



 aspart (NovoLOG) subcutaneous correction          



 scale  Premeal Blood Glucose=Insulin Dose          



         150-199          



      2 units           200-249          



        3 units           250-299          



                      5 units          



 300-349                                  7          



 units           Over 349          



             8 units,          









 Given  2019  7:40 AM CDT  2 Units    

 

 Given  07/15/2019  5:24 PM CDT  2 Units    









   

 

 labetalol (NORMODYNE;TRANDATE) IV solution 20 mg



  



 20 mg, IV, Every two hours prn, Starting Fri 19 at 2106, Until 
Discontinued,  



 blood pressure, SBP > 180 or DBP > 100, 20 mL, Use first.  Do NOT give if HR < 
60,  

 

   

 

 metoclopramide (REGLAN) inj soln 5 mg



  



 5 mg, IV, Every six hours prn, Starting 19 at 1825, Until Discontinued
,  



 nausea, vomiting, 2 mL, Use SECOND. If ineffective and ondansetron used, call  



 physician for alternative If preference is to further dilute for IV 
administration:  



 First draw up patient-specific dose, then dilute to 10 mL with 0.9% sodium  



 chloride.,  

 

   









 ondansetron (ZOFRAN) injection solution 4 mg



  Given  2019  6:44 PM CDT  4 mg    



 4 mg, IV, Every four hours prn, Starting 19 at 1825, Until Discontinued, nausea,          



 vomiting, 2 mL, Use FIRST. If ineffective          



 after 15 minutes use metoclopramide. If          



 preference is to further dilute for IV          



 administration: First draw up patient-specific          



 dose, then dilute to 10 mL with 0.9% sodium          



 chloride.,          









   









 oxyCODONE (OXY-IR) tablet 5 mg



  Given  2019  7:55 AM CDT  5 mg    



 5 mg, Oral, Every four hours prn, Starting 19 at 1826, Until Discontinued, moderate          



 pain, for pain Scale 4 to 6 or pain not          



 relieved by medications for Pain Scale 1 - 3,          



 Pain stratification is defined as follows for          



 either analog scale (0-10) or critical care          



 pain observation tool (CPOT, 0-8).  a. No pain          



 (0) b. Mild pain level (1-3) c. Moderate pain          



 level (4-6) d. Severe pain level (greater than          



 or equal to 7),          









 Given  2019  4:50 PM CDT  5 mg    

 

 Given  2019  6:29 AM CDT  5 mg    









   









 polyethylene glycol (MIRALAX) packet 1  Given  2019  8:54 AM CDT  1 
packet    



 packet



          



 1 packet, Oral, Two times a day, First          



 dose on Sat 7/13/19 at 2100, Until          



 Discontinued, Post - Op, Hold if 2 loose          



 stools occur in the last 24 hours.,          









 Given  07/15/2019  9:25 PM CDT  1 packet    

 

 Given  07/15/2019 10:37 AM CDT  1 packet    









   









 sodium chloride 0.9% flush (adult) 10 mL



  Given  2019  8:54 AM CDT  10 mL    



 10 mL, IV, Two times a day and prn, First          



 dose on 19 at 2100, Until          



 Discontinued, 10 mL, Flush IV line as          



 scheduled and as often as necessary before          



 and after meds.,          









 Given  07/15/2019  9:25 PM CDT  10 mL    

 

 Given  2019  9:19 PM CDT  10 mL    









   









 









 Medication Order  MAR Action  Action Date  Dose  Rate  Site

 

 carVEDilol (COREG) tablet 12.5  Given  2019  5:18 PM CDT  12.5 mg    



 mg



          



 12.5 mg, Oral, Two times a day          



 with meals, First dose on 19 at 2100, Until          



 Discontinued, Take with food.          



 Hold for SBP less than 100 Hold          



 for HR less than 60 Take with          



 food.,          









 Given  2019  7:21 AM CDT  12.5 mg    

 

 Given  2019  9:14 PM CDT  12.5 mg    









   









 ceFAZolin (ANCEF) 2000 mg/20 mL sterile  Given  2019  4:59 AM CDT  2,000 
mg    



 water IV syringe



          



 2,000 mg, IV, Every eight hours, 2 doses,          



 First dose on Sat 7/13/19 at 2200, Last          



 dose on Sun 7/14/19 at 0600, 20 mL, PACU -          



 Continue Post-Op, Administer as IV push          



 over 4 minutes.,          









 Given  2019  9:21 PM CDT  2,000 mg    









   









 celecoxib (celeBREX) capsule 400 mg



  Given  07/15/2019 10:37 AM CDT  400 mg    



 400 mg, Oral, Two times a day, 2 doses,          



 First dose on Sun 7/14/19 at 2100, Last dose          



 on Mon 7/15/19 at 0900, Post - Op          









 Given  2019  7:54 PM CDT  400 mg    









   









 fentaNYL 100 mcg/2 mL preservative free  Given  2019  7:30 PM CDT  25 
mcg    



 injection solution 25 mcg



          



 25 mcg, IV, Every two hours prn, Starting          



 19 at 1826, Until Sat 19 at          



 1705, severe pain, For pain Scale 7 or          



 greater or pain not relieved by medications          



 for Pain Scale 4-6, 2 mL, Pain          



 stratification is defined as follows for          



 either analog scale (0-10) or critical care          



 pain observation tool (CPOT, 0-8).  boone No          



 pain (0) b. Mild pain level (1-3) c.          



 Moderate pain level (4-6) d. Severe pain          



 level (greater than or equal to 7),          









   









 lactated ringers IV solution



  Already Infusing  2019  3:22 PM CDT    125 mL/hr  



 IV, at 125 mL/hr, Continuous,          



 Starting Sat 19 at 1530,          



 Until Sat 19 at 1634,          



 1,000 mL, PACU, TKO current          



 fluids if patient is going to          



 Day Unit / ARU and tolerating          



 PO fluids without nausea.,          









   









 sodium chloride 0.9% IV solution



  Restarted  2019  4:40 PM CDT    50 mL/hr  



 IV, at 50 mL/hr, Continuous, Starting          



 Fri 19 at 2225, Until Sat 19          



 at 1704, 1,000 mL          









 New Bag  2019 11:28 PM CDT    50 mL/hr  









   









 sodium chloride 0.9% IV solution



  New Bag  07/15/2019  6:33 AM CDT    100 mL/hr  



 IV, at 100 mL/hr, Continuous, Starting          



 Sat 19 at 1645, Until Mon 7/15/19          



 at 1326, 1,000 mL, Post - Op          









 New Bag  2019  3:46 PM CDT    100 mL/hr  

 

 New Bag  2019  9:21 PM CDT    100 mL/hr  









   



documented in this encounter

## 2019-07-18 RX ADMIN — HYDROCODONE BITATRATE AND ACETAMINOPHEN PRN TAB: 5; 325 TABLET ORAL at 07:31

## 2019-07-18 RX ADMIN — VITAMIN D, TAB 1000IU (100/BT) SCH MCG: 25 TAB at 07:30

## 2019-07-18 RX ADMIN — CYANOCOBALAMIN TAB 1000 MCG SCH MCG: 1000 TAB at 07:30

## 2019-07-18 RX ADMIN — HYDROCODONE BITATRATE AND ACETAMINOPHEN PRN TAB: 5; 325 TABLET ORAL at 18:54

## 2019-07-19 RX ADMIN — HYDROCODONE BITATRATE AND ACETAMINOPHEN PRN TAB: 5; 325 TABLET ORAL at 12:11

## 2019-07-19 RX ADMIN — CYANOCOBALAMIN TAB 1000 MCG SCH MCG: 1000 TAB at 07:10

## 2019-07-19 RX ADMIN — HYDROCODONE BITATRATE AND ACETAMINOPHEN PRN TAB: 5; 325 TABLET ORAL at 07:10

## 2019-07-19 RX ADMIN — HYDROCODONE BITATRATE AND ACETAMINOPHEN PRN TAB: 5; 325 TABLET ORAL at 18:15

## 2019-07-19 RX ADMIN — VITAMIN D, TAB 1000IU (100/BT) SCH MCG: 25 TAB at 07:09

## 2019-07-20 RX ADMIN — HYDROCODONE BITATRATE AND ACETAMINOPHEN PRN TAB: 5; 325 TABLET ORAL at 16:35

## 2019-07-20 RX ADMIN — HYDROCODONE BITATRATE AND ACETAMINOPHEN PRN TAB: 5; 325 TABLET ORAL at 20:39

## 2019-07-20 RX ADMIN — HYDROCODONE BITATRATE AND ACETAMINOPHEN PRN TAB: 5; 325 TABLET ORAL at 12:32

## 2019-07-20 RX ADMIN — VITAMIN D, TAB 1000IU (100/BT) SCH MCG: 25 TAB at 07:55

## 2019-07-20 RX ADMIN — HYDROCODONE BITATRATE AND ACETAMINOPHEN PRN TAB: 5; 325 TABLET ORAL at 05:02

## 2019-07-20 RX ADMIN — CYANOCOBALAMIN TAB 1000 MCG SCH MCG: 1000 TAB at 07:56

## 2019-07-21 RX ADMIN — VITAMIN D, TAB 1000IU (100/BT) SCH MCG: 25 TAB at 07:48

## 2019-07-21 RX ADMIN — HYDROCODONE BITATRATE AND ACETAMINOPHEN PRN TAB: 5; 325 TABLET ORAL at 07:46

## 2019-07-21 RX ADMIN — HYDROCODONE BITATRATE AND ACETAMINOPHEN PRN TAB: 5; 325 TABLET ORAL at 19:44

## 2019-07-21 RX ADMIN — CYANOCOBALAMIN TAB 1000 MCG SCH MCG: 1000 TAB at 07:48

## 2019-07-21 RX ADMIN — HYDROCODONE BITATRATE AND ACETAMINOPHEN PRN TAB: 5; 325 TABLET ORAL at 15:42

## 2019-07-22 RX ADMIN — HYDROCODONE BITATRATE AND ACETAMINOPHEN PRN TAB: 5; 325 TABLET ORAL at 15:37

## 2019-07-22 RX ADMIN — HYDROCODONE BITATRATE AND ACETAMINOPHEN PRN TAB: 5; 325 TABLET ORAL at 11:47

## 2019-07-22 RX ADMIN — HYDROCODONE BITATRATE AND ACETAMINOPHEN PRN TAB: 5; 325 TABLET ORAL at 06:06

## 2019-07-22 RX ADMIN — VITAMIN D, TAB 1000IU (100/BT) SCH MCG: 25 TAB at 07:27

## 2019-07-22 RX ADMIN — CYANOCOBALAMIN TAB 1000 MCG SCH MCG: 1000 TAB at 07:27

## 2019-07-23 RX ADMIN — HYDROCODONE BITATRATE AND ACETAMINOPHEN PRN TAB: 5; 325 TABLET ORAL at 03:44

## 2019-07-23 RX ADMIN — HYDROCODONE BITATRATE AND ACETAMINOPHEN PRN TAB: 5; 325 TABLET ORAL at 07:47

## 2019-07-23 RX ADMIN — CYANOCOBALAMIN TAB 1000 MCG SCH MCG: 1000 TAB at 07:46

## 2019-07-23 RX ADMIN — VITAMIN D, TAB 1000IU (100/BT) SCH MCG: 25 TAB at 07:45

## 2019-07-23 RX ADMIN — HYDROCODONE BITATRATE AND ACETAMINOPHEN PRN TAB: 5; 325 TABLET ORAL at 12:10

## 2019-07-24 RX ADMIN — CYANOCOBALAMIN TAB 1000 MCG SCH MCG: 1000 TAB at 08:09

## 2019-07-24 RX ADMIN — VITAMIN D, TAB 1000IU (100/BT) SCH MCG: 25 TAB at 08:09

## 2019-07-24 RX ADMIN — HYDROCODONE BITATRATE AND ACETAMINOPHEN PRN TAB: 5; 325 TABLET ORAL at 02:06

## 2019-07-24 RX ADMIN — HYDROCODONE BITATRATE AND ACETAMINOPHEN PRN TAB: 5; 325 TABLET ORAL at 08:04

## 2019-07-25 RX ADMIN — CYANOCOBALAMIN TAB 1000 MCG SCH MCG: 1000 TAB at 08:13

## 2019-07-25 RX ADMIN — HYDROCODONE BITATRATE AND ACETAMINOPHEN PRN TAB: 7.5; 325 TABLET ORAL at 08:17

## 2019-07-25 RX ADMIN — HYDROCODONE BITATRATE AND ACETAMINOPHEN PRN TAB: 7.5; 325 TABLET ORAL at 17:21

## 2019-07-25 RX ADMIN — VITAMIN D, TAB 1000IU (100/BT) SCH MCG: 25 TAB at 08:14

## 2019-07-25 RX ADMIN — HYDROCODONE BITATRATE AND ACETAMINOPHEN PRN TAB: 7.5; 325 TABLET ORAL at 01:47

## 2019-07-26 RX ADMIN — HYDROCODONE BITATRATE AND ACETAMINOPHEN PRN TAB: 7.5; 325 TABLET ORAL at 09:36

## 2019-07-26 RX ADMIN — HYDROCODONE BITATRATE AND ACETAMINOPHEN PRN TAB: 7.5; 325 TABLET ORAL at 23:37

## 2019-07-26 RX ADMIN — CYANOCOBALAMIN TAB 1000 MCG SCH MCG: 1000 TAB at 07:27

## 2019-07-26 RX ADMIN — VITAMIN D, TAB 1000IU (100/BT) SCH MCG: 25 TAB at 07:27

## 2019-07-27 RX ADMIN — VITAMIN D, TAB 1000IU (100/BT) SCH MCG: 25 TAB at 07:58

## 2019-07-27 RX ADMIN — HYDROCODONE BITATRATE AND ACETAMINOPHEN PRN TAB: 7.5; 325 TABLET ORAL at 15:22

## 2019-07-27 RX ADMIN — CYANOCOBALAMIN TAB 1000 MCG SCH MCG: 1000 TAB at 07:58

## 2019-07-28 RX ADMIN — VITAMIN D, TAB 1000IU (100/BT) SCH MCG: 25 TAB at 09:08

## 2019-07-28 RX ADMIN — HYDROCODONE BITATRATE AND ACETAMINOPHEN PRN TAB: 7.5; 325 TABLET ORAL at 13:00

## 2019-07-28 RX ADMIN — CYANOCOBALAMIN TAB 1000 MCG SCH MCG: 1000 TAB at 09:08

## 2019-07-29 RX ADMIN — HYDROCODONE BITATRATE AND ACETAMINOPHEN PRN TAB: 7.5; 325 TABLET ORAL at 07:29

## 2019-07-29 RX ADMIN — CYANOCOBALAMIN TAB 1000 MCG SCH MCG: 1000 TAB at 07:27

## 2019-07-29 RX ADMIN — VITAMIN D, TAB 1000IU (100/BT) SCH MCG: 25 TAB at 07:27

## 2019-07-30 RX ADMIN — CYANOCOBALAMIN TAB 1000 MCG SCH MCG: 1000 TAB at 07:25

## 2019-07-30 RX ADMIN — VITAMIN D, TAB 1000IU (100/BT) SCH MCG: 25 TAB at 07:25

## 2019-07-30 RX ADMIN — HYDROCODONE BITATRATE AND ACETAMINOPHEN PRN TAB: 7.5; 325 TABLET ORAL at 14:04

## 2019-07-30 RX ADMIN — HYDROCODONE BITATRATE AND ACETAMINOPHEN PRN TAB: 7.5; 325 TABLET ORAL at 09:07

## 2019-07-30 NOTE — PCM.SN
- Free Text/Narrative


Note: 





Late Entry: July 24, 2019. patient is having increased complaints of left hip 

pain. He was doing well over the weekend, but the pain has intensified. He 

denies falling or re-injuring the area. He has been taking his pain medication, 

but very sporadic. We will re-image left hip to make sure hardware is in place. 

We will adjust medications. Recommend that he start taking Tylenol scheduled 

along with his Norco for pain relief. Encouraged him that the [place] 

administrations should not be more than 6 hours apart. We will wait to see what 

the xray shows, but also will recommend an earlier follow up with ortho.

## 2019-07-31 LAB
CHLORIDE SERPL-SCNC: 103 MMOL/L (ref 98–107)
SODIUM SERPL-SCNC: 139 MMOL/L (ref 136–145)

## 2019-07-31 RX ADMIN — VITAMIN D, TAB 1000IU (100/BT) SCH MCG: 25 TAB at 08:12

## 2019-07-31 RX ADMIN — CYANOCOBALAMIN TAB 1000 MCG SCH MCG: 1000 TAB at 08:12

## 2019-07-31 RX ADMIN — HYDROCODONE BITATRATE AND ACETAMINOPHEN PRN TAB: 7.5; 325 TABLET ORAL at 08:21

## 2019-08-01 RX ADMIN — VITAMIN D, TAB 1000IU (100/BT) SCH MCG: 25 TAB at 07:18

## 2019-08-01 RX ADMIN — HYDROCODONE BITATRATE AND ACETAMINOPHEN PRN TAB: 7.5; 325 TABLET ORAL at 09:30

## 2019-08-01 RX ADMIN — CYANOCOBALAMIN TAB 1000 MCG SCH MCG: 1000 TAB at 07:16

## 2019-08-01 NOTE — PCM.PN
- General Info


Date of Service: 08/01/19


Functional Status: Reports: Ambulating (with PT assistance )





- Review of Systems


General: Reports: Weakness (improving per patient )


HEENT: Reports: No Symptoms


Pulmonary: Reports: No Symptoms


Cardiovascular: Reports: No Symptoms


Gastrointestinal: Reports: No Symptoms


Genitourinary: Reports: No Symptoms


Musculoskeletal: Denies: Leg Pain, Joint Pain


Skin: Reports: Other (staples removed today; incision intact).  Denies: Bruising


Neurological: Reports: No Symptoms


Psychiatric: Reports: No Symptoms





- Patient Data


Vitals - Most Recent: 


 Last Vital Signs











Temp  98 F   08/01/19 07:32


 


Pulse  95   08/01/19 07:32


 


Resp  16   08/01/19 07:32


 


BP  130/68   08/01/19 07:32


 


Pulse Ox  99   08/01/19 07:32











Weight - Most Recent: 195 lb 1 oz


I&O - Last 24 Hours: 


 Intake & Output











 07/31/19 08/01/19 08/01/19





 22:59 06:59 14:59


 


Intake Total   180


 


Output Total  400 


 


Balance  -400 180











Med Orders - Current: 


 Current Medications





Acetaminophen (Tylenol Extra Strength)  1,000 mg PO TID [facility]


   Last Admin: 08/01/19 11:05 Dose:  1,000 mg


Hydrocodone Bitart/Acetaminophen (Norco 325-7.5 Mg)  1 tab PO Q3H PRN


   PRN Reason: Severe hip pain


   Last Admin: 08/01/19 09:30 Dose:  1 tab


Aspirin (Aspirin)  325 mg PO DAILY [facility]


   Last Admin: 08/01/19 07:15 Dose:  325 mg


Carvedilol (Coreg)  12.5 mg PO BID [facility]


   Last Admin: 08/01/19 07:17 Dose:  12.5 mg


Cholecalciferol (Vitamin D3)  25 mcg PO DAILY [facility]


   Last Admin: 08/01/19 07:18 Dose:  25 mcg


Coenzyme Q10 (Coenzyme Q10)  100 mg PO DAILY [facility]


   Last Admin: 08/01/19 07:16 Dose:  100 mg


Cyanocobalamin (Vitamin B12)  1,000 mcg PO DAILY [facility]


   Last Admin: 08/01/19 07:16 Dose:  1,000 mcg


Enoxaparin Sodium (Lovenox)  40 mg SUBCUT DAILY [facility]


   Last Admin: 08/01/19 07:18 Dose:  40 mg


Latanoprost (Xalatan 0.005% Ophth Soln)  0 ml EYEBOTH BEDTIME [facility]


   Last Admin: 07/31/19 19:31 Dose:  1 drop


Lisinopril (Prinivil)  5 mg PO DAILY [facility]


   Last Admin: 08/01/19 07:18 Dose:  5 mg


Magnesium Oxide (Magnesium Oxide)  400 mg PO DAILY [facility]


   Last Admin: 08/01/19 07:16 Dose:  400 mg


Metformin HCl (Glucophage)  1,000 mg PO BID [facility]


   Last Admin: 08/01/19 07:16 Dose:  1,000 mg


Milk Thistle Seed Extract [Milk Thistle] 100 Mg  100 mg PO DAILY [facility]


Ginkgo 60 Mg Tabs  1 each PO DAILY [facility]


Ondansetron HCl (Zofran Odt)  4 mg PO Q4H PRN


   PRN Reason: Nausea/Vomiting


   Last Admin: 07/24/19 14:12 Dose:  4 mg


Polyethylene Glycol (Miralax)  17 gm PO DAILY [facility]


   Last Admin: 08/01/19 07:15 Dose:  17 gm





Discontinued Medications





Acetaminophen (Tylenol Extra Strength)  1,000 mg PO Q6HR PRN


   PRN Reason: Pain


   Last Admin: 07/22/19 18:04 Dose:  1,000 mg


Hydrocodone Bitart/Acetaminophen (Norco 325-5 Mg)  1 tab PO ONETIME ONE


   Stop: 07/16/19 14:35


   Last Admin: 07/16/19 14:52 Dose:  1 tab


Hydrocodone Bitart/Acetaminophen (Norco 325-5 Mg)  1 tab PO Q4H PRN


   PRN Reason: Pain (severe 7-10)


   Last Admin: 07/24/19 08:04 Dose:  1 tab


Enoxaparin Sodium (Lovenox)  40 mg SUBCUT DAILY [facility]











- Exam


General: Alert, Oriented


Neck: Supple


Lungs: Clear to Auscultation, Normal Respiratory Effort


Cardiovascular: Regular Rate, Regular Rhythm


GI/Abdominal Exam: Normal Bowel Sounds, Soft, Non-Tender, No Organomegaly, No 

Distention, No Abnormal Bruit, No Mass, Pelvis Stable


 (Male) Exam: Deferred


Back Exam: Normal Inspection, Full Range of Motion


Extremities: Normal Inspection, Non-Tender, Limited Range of Motion (patient 

working with PT).  No: Leg Pain


Skin: Warm, Dry


Wound/Incisions: Healing Well, Other (staples removed today )


Neurological: No New Focal Deficit


Psy/Mental Status: Alert, Normal Affect, Normal Mood





- Problem List & Annotations


(1) Left femoral shaft fracture


SNOMED Code(s): 81795031


   Code(s): S72.302A - UNSP FRACTURE OF SHAFT OF LEFT FEMUR, INIT FOR CLOS FX   

Status: Acute   Current Visit: Yes   


Qualifiers: 


   Encounter type: initial encounter   Fracture type: closed   Fracture 

morphology: other fracture   Qualified Code(s): S72.392A - Other fracture of 

shaft of left femur, initial encounter for closed fracture   


Annotation/Comment:: Patient working with PT/OT; plans to DC on 8-2-19   





(2) Hypertension


SNOMED Code(s): 69294271


   Code(s): I10 - ESSENTIAL (PRIMARY) HYPERTENSION   Status: Acute   Current 

Visit: Yes   


Qualifiers: 


   Hypertension type: unspecified   Qualified Code(s): I10 - Essential (primary

) hypertension   


Annotation/Comment:: Stable   





(3) Diabetes mellitus


SNOMED Code(s): 03261869


   Code(s): E11.9 - TYPE 2 DIABETES MELLITUS WITHOUT COMPLICATIONS   Status: 

Chronic   Priority: Medium   Current Visit: No   


Qualifiers: 


   Diabetes mellitus type: type 2   Diabetes mellitus long term insulin use: 

without long term use   Diabetes mellitus complication status: without 

complication   Qualified Code(s): E11.9 - Type 2 diabetes mellitus without 

complications   


Annotation/Comment:: stable   





- Problem List Review


Problem List Initiated/Reviewed/Updated: Yes

## 2019-08-02 VITALS — SYSTOLIC BLOOD PRESSURE: 122 MMHG | DIASTOLIC BLOOD PRESSURE: 94 MMHG | HEART RATE: 78 BPM

## 2019-08-02 RX ADMIN — CYANOCOBALAMIN TAB 1000 MCG SCH MCG: 1000 TAB at 07:11

## 2019-08-02 RX ADMIN — VITAMIN D, TAB 1000IU (100/BT) SCH MCG: 25 TAB at 07:09

## 2019-08-02 NOTE — PCM.DCSUM1
**Discharge Summary





- Hospital Course


Free Text/Narrative:: 





Patient is here in [facility] for rehabilitation s/p left hip surgery. He has been 

doing well and is ambulating with wheeled walker. He has been using Tylenol QID 

for pain control along with Norco 7.5/325mg TID PRN. He reports that he only 

used 1 tablet daily prior to therapy. Otherwise, his pain is controlled. He has 

no concerns. He denies any incisional pain or discomfort wtih movement. He is 

able to complete all his own ADLs. He will be going home with outpatient PT and 

OT. He has elected not to have Home Health at this time. Education provided 

regarding enlisting Home Health at a later time if he felt he needed additional 

assistance. 


Diagnosis: Stroke: No





- Discharge Data


Discharge Date: 08/02/19


Discharge Disposition: Home, Self-Care 01


Condition: Good





- Patient Summary/Data


Consults: 


 Consultations





07/16/19 14:35


OT Evaluation and Treatment [CONS] Routine 


PT Evaluation and Treatment [CONS] Routine 














- Patient Instructions


Diet: Diabetic Diet


Activity: Apply Ice, As Tolerated


Showering/Bathing: May Shower


Notify Provider of: Fever, Increased Pain, Swelling and Redness, Drainage





- Discharge Plan


*PRESCRIPTION DRUG MONITORING PROGRAM REVIEWED*: Yes


*COPY OF PRESCRIPTION DRUG MONITORING REPORT IN PATIENT ALEXIS: Yes


Prescriptions/Med Rec: 


Acetaminophen [Acetaminophen Extra Strength] 1,000 mg PO BID PRN #1 bottle


 PRN Reason: Pain


Acetaminophen/HYDROcodone [Norco 325-7.5 MG] 1 tab PO Q6H PRN #30 tablet


 PRN Reason: Severe hip pain


Carvedilol [Coreg] 12.5 mg PO BID #60 tablet


Lisinopril [Prinivil] 5 mg PO DAILY #30 tablet


Magnesium Oxide 400 mg PO DAILY #30 tablet


metFORMIN [Glucophage] 1,000 mg PO BID #60 tablet


Polyethylene Glycol 3350 [MiraLAX] 17 gm PO DAILY #1 bottle


Home Medications: 


 Home Meds





Acetaminophen [Acetaminophen Extra Strength] 1,000 mg PO BID PRN #1 bottle 08/02 /19 [Rx]


Acetaminophen/HYDROcodone [Norco 325-7.5 MG] 1 tab PO Q6H PRN #30 tablet 08/02/ 19 [Rx]


Aspirin 325 mg PO DAILY  tablet 08/02/19 [Rx]


Carvedilol [Coreg] 12.5 mg PO BID #60 tablet 08/02/19 [Rx]


Cholecalciferol (Vitamin D3) [Vitamin D3] 25 mcg PO DAILY  tablet 08/02/19 [Rx]


Cyanocobalamin (Vitamin B12) [Vitamin B12] 1,000 mcg PO DAILY  tablet 08/02/19 [

Rx]


Enoxaparin [Lovenox] 40 mg SUBCUT DAILY  syringe 08/02/19 [Rx]


Latanoprost [Xalatan 0.005% Ophth Soln] 0 ml EYEBOTH BEDTIME  bottle 08/02/19 [

Rx]


Lisinopril [Prinivil] 5 mg PO DAILY #30 tablet 08/02/19 [Rx]


Magnesium Oxide 400 mg PO DAILY #30 tablet 08/02/19 [Rx]


Milk Thistle Seed Extract [Milk Thistle] 100 mg PO DAILY 08/02/19 [Rx]


Non-Formulary Medication [NF Drug] 1 each PO DAILY  each 08/02/19 [Rx]


Polyethylene Glycol 3350 [MiraLAX] 17 gm PO DAILY #1 bottle 08/02/19 [Rx]


Ubidecarenone [Coenzyme Q10] 100 mg PO DAILY  cap 08/02/19 [Rx]


metFORMIN [Glucophage] 1,000 mg PO BID #60 tablet 08/02/19 [Rx]








Other Amb Orders: 


OT Evaluation and Treatment [CONS] Location: None Selected


PT Evaluation and Treatment [CONS] Location: None Selected





- Discharge Summary/Plan Comment


DC Time >30 min.: Yes





- General Info


Date of Service: 08/02/19


Admission Dx/Problem (Free Text: 





s/p left hip surgery


Functional Status: Reports: Pain Controlled, Tolerating Diet, Ambulating





- Review of Systems


General: Reports: No Symptoms


HEENT: Reports: No Symptoms


Pulmonary: Reports: No Symptoms


Cardiovascular: Reports: No Symptoms


Gastrointestinal: Reports: No Symptoms


Musculoskeletal: Reports: Leg Pain


Skin: Reports: No Symptoms


Neurological: Reports: No Symptoms


Psychiatric: Reports: No Symptoms





- Patient Data


Vitals - Most Recent: 


 Last Vital Signs











Temp  98.3 F   08/02/19 07:18


 


Pulse  78   08/02/19 07:18


 


Resp  15   08/02/19 07:18


 


BP  122/94 H  08/02/19 07:18


 


Pulse Ox  99   08/02/19 07:18











Weight - Most Recent: 195 lb 1 oz


I&O - Last 24 hours: 


 Intake & Output











 08/01/19 08/02/19 08/02/19





 22:59 06:59 14:59


 


Intake Total 350  


 


Balance 350  











Lab Results - Last 24 hrs: 


 Laboratory Results - last 24 hr











  08/02/19 Range/Units





  07:05 


 


POC Glucose  103  ()  mg/dl











Med Orders - Current: 


 Current Medications





Acetaminophen (Tylenol Extra Strength)  1,000 mg PO TID [facility]


   Last Admin: 08/02/19 07:10 Dose:  1,000 mg


Hydrocodone Bitart/Acetaminophen (Norco 325-7.5 Mg)  1 tab PO Q3H PRN


   PRN Reason: Severe hip pain


   Last Admin: 08/01/19 09:30 Dose:  1 tab


Aspirin (Aspirin)  325 mg PO DAILY [facility]


   Last Admin: 08/02/19 07:12 Dose:  325 mg


Carvedilol (Coreg)  12.5 mg PO BID [facility]


   Last Admin: 08/02/19 07:10 Dose:  12.5 mg


Cholecalciferol (Vitamin D3)  25 mcg PO DAILY [facility]


   Last Admin: 08/02/19 07:09 Dose:  25 mcg


Coenzyme Q10 (Coenzyme Q10)  100 mg PO DAILY [facility]


   Last Admin: 08/02/19 07:11 Dose:  100 mg


Cyanocobalamin (Vitamin B12)  1,000 mcg PO DAILY [facility]


   Last Admin: 08/02/19 07:11 Dose:  1,000 mcg


Enoxaparin Sodium (Lovenox)  40 mg SUBCUT DAILY [facility]


   Last Admin: 08/02/19 07:05 Dose:  40 mg


Latanoprost (Xalatan 0.005% Ophth Soln)  0 ml EYEBOTH BEDTIME [facility]


   Last Admin: 08/01/19 20:25 Dose:  1 drop


Lisinopril (Prinivil)  5 mg PO DAILY [facility]


   Last Admin: 08/02/19 07:11 Dose:  5 mg


Magnesium Oxide (Magnesium Oxide)  400 mg PO DAILY [facility]


   Last Admin: 08/02/19 07:10 Dose:  400 mg


Metformin HCl (Glucophage)  1,000 mg PO BID [facility]


   Last Admin: 08/02/19 07:10 Dose:  1,000 mg


Milk Thistle Seed Extract [Milk Thistle] 100 Mg  100 mg PO DAILY [facility]


Ginkgo 60 Mg Tabs  1 each PO DAILY [facility]


Ondansetron HCl (Zofran Odt)  4 mg PO Q4H PRN


   PRN Reason: Nausea/Vomiting


   Last Admin: 07/24/19 14:12 Dose:  4 mg


Polyethylene Glycol (Miralax)  17 gm PO DAILY [facility]


   Last Admin: 08/02/19 07:09 Dose:  17 gm





Discontinued Medications





Acetaminophen (Tylenol Extra Strength)  1,000 mg PO Q6HR PRN


   PRN Reason: Pain


   Last Admin: 07/22/19 18:04 Dose:  1,000 mg


Hydrocodone Bitart/Acetaminophen (Norco 325-5 Mg)  1 tab PO ONETIME ONE


   Stop: 07/16/19 14:35


   Last Admin: 07/16/19 14:52 Dose:  1 tab


Hydrocodone Bitart/Acetaminophen (Norco 325-5 Mg)  1 tab PO Q4H PRN


   PRN Reason: Pain (severe 7-10)


   Last Admin: 07/24/19 08:04 Dose:  1 tab


Enoxaparin Sodium (Lovenox)  40 mg SUBCUT DAILY [facility]











- Exam


General: Reports: Alert, Oriented, Cooperative


HEENT: Reports: Pupils Equal, Pupils Reactive, EOMI, Mucous Membr. Moist/Pink


Neck: Reports: Supple


Lungs: Reports: Clear to Auscultation, Normal Respiratory Effort


Cardiovascular: Reports: Regular Rate, Regular Rhythm, No Murmurs


GI/Abdominal Exam: Normal Bowel Sounds, Soft, Non-Tender, No Distention


 (Male) Exam: Deferred


Rectal (Males) Exam: Deferred


Back Exam: Reports: Normal Inspection, Full Range of Motion


Extremities: Normal Inspection, Normal Range of Motion, No Pedal Edema


Skin: Reports: Warm, Dry, Intact


Wound/Incisions: Reports: Healing Well


Neurological: Reports: No New Focal Deficit


Psy/Mental Status: Reports: Alert, Normal Affect, Normal Mood

## 2020-05-17 ENCOUNTER — HOSPITAL ENCOUNTER (EMERGENCY)
Dept: HOSPITAL 52 - LL.ED | Age: 85
Discharge: NURSING, REHAB OR LONG-TERM CARE | End: 2020-05-17
Payer: MEDICARE

## 2020-05-17 VITALS — HEART RATE: 88 BPM | DIASTOLIC BLOOD PRESSURE: 86 MMHG | SYSTOLIC BLOOD PRESSURE: 169 MMHG

## 2020-05-17 DIAGNOSIS — I11.0: ICD-10-CM

## 2020-05-17 DIAGNOSIS — S02.2XXA: ICD-10-CM
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
## 2020-05-17 DIAGNOSIS — Z23: ICD-10-CM

## 2020-05-17 DIAGNOSIS — E78.00: ICD-10-CM

## 2020-05-17 DIAGNOSIS — I25.10: ICD-10-CM

## 2020-05-17 DIAGNOSIS — S01.81XA: ICD-10-CM

## 2020-05-17 DIAGNOSIS — J44.9: ICD-10-CM

## 2020-05-17 DIAGNOSIS — S62.522A: ICD-10-CM

## 2020-05-17 DIAGNOSIS — Z79.899: ICD-10-CM

## 2020-05-17 DIAGNOSIS — I50.9: ICD-10-CM

## 2020-05-17 DIAGNOSIS — E11.51: ICD-10-CM

## 2020-05-17 DIAGNOSIS — D64.9: ICD-10-CM

## 2020-05-17 DIAGNOSIS — S12.100A: ICD-10-CM

## 2020-05-17 DIAGNOSIS — Z79.82: ICD-10-CM

## 2020-05-17 DIAGNOSIS — S06.5X0A: Primary | ICD-10-CM
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
## 2020-05-17 DIAGNOSIS — Z79.84: ICD-10-CM

## 2020-05-17 DIAGNOSIS — Z87.891: ICD-10-CM

## 2020-05-17 LAB
APTT PPP: 22.6 SEC (ref 24.5–32.8)
CHLORIDE SERPL-SCNC: 105 MMOL/L (ref 98–107)
SODIUM SERPL-SCNC: 142 MMOL/L (ref 136–145)

## 2020-05-17 PROCEDURE — 85610 PROTHROMBIN TIME: CPT

## 2020-05-17 PROCEDURE — 12014 RPR F/E/E/N/L/M 5.1-7.5 CM: CPT

## 2020-05-17 PROCEDURE — 85025 COMPLETE CBC W/AUTO DIFF WBC: CPT

## 2020-05-17 PROCEDURE — 96375 TX/PRO/DX INJ NEW DRUG ADDON: CPT

## 2020-05-17 PROCEDURE — 72125 CT NECK SPINE W/O DYE: CPT

## 2020-05-17 PROCEDURE — 96376 TX/PRO/DX INJ SAME DRUG ADON: CPT

## 2020-05-17 PROCEDURE — 85730 THROMBOPLASTIN TIME PARTIAL: CPT

## 2020-05-17 PROCEDURE — 80053 COMPREHEN METABOLIC PANEL: CPT

## 2020-05-17 PROCEDURE — 96374 THER/PROPH/DIAG INJ IV PUSH: CPT

## 2020-05-17 PROCEDURE — 70450 CT HEAD/BRAIN W/O DYE: CPT

## 2020-05-17 PROCEDURE — 36415 COLL VENOUS BLD VENIPUNCTURE: CPT

## 2020-05-17 PROCEDURE — 73130 X-RAY EXAM OF HAND: CPT

## 2020-05-17 PROCEDURE — 70486 CT MAXILLOFACIAL W/O DYE: CPT

## 2020-05-17 PROCEDURE — 90715 TDAP VACCINE 7 YRS/> IM: CPT

## 2020-05-17 PROCEDURE — 99285 EMERGENCY DEPT VISIT HI MDM: CPT

## 2020-05-17 PROCEDURE — 72020 X-RAY EXAM OF SPINE 1 VIEW: CPT

## 2020-05-17 NOTE — EDM.PDOC
ED HPI GENERAL MEDICAL PROBLEM





- General


Chief Complaint: General


Stated Complaint: laceration, fall, neck pain


Time Seen by Provider: 20 12:15


Source of Information: Reports: Patient, EMS, Old Records ([facility] chart/EMR).  Denies: EMS Notes Reviewed (Records not available at time 

of dictation)


History Limitations: Reports: No Limitations





- History of Present Illness


INITIAL COMMENTS - FREE TEXT/NARRATIVE: 





The patient was brought to the emergency room via ambulance with paramedic 

accompaniment for evaluation of a fall, which occurred at about 11:10 AM this 

morning while the patient was walking at the [facility], stumbled and fell 

face first onto the pavement. He was using his cane at this time, although he 

felt himself stumbling forwards during the last lap of his routine walking 

exercise in that area. No history of recent visual changes, diplopia, loss of 

consciousness, change in mental status, or other change in neurological status. 

The paramedics did put a dressing on his head and right hand with no other 

treatment in route. He complains of 10/10 headache and neck pain with some 

initial nausea on arrival, however resolved prior to my exam. He denies any 

other injuries other than his hands, including low back pain, chest wall 

tenderness, leg/hip pain, etc.. The patient denies any chest pain/pressure, 

heart flutter, dizziness, orthostasis, orthopnea, diaphoresis, paresthesias, 

recent decreased exercise tolerance, or any other anginal-type symptoms. No 

recent history of abdominal pain, heartburn, emesis, diarrhea, melena, gross 

hematochezia, or any food intolerance, including fatty foods, etc.. He denies 

any gross hematuria, colic, or other UTI symptoms. The patient also denies any 

recent fever, cough, wheezing, dyspnea, etc..


Onset: Today, Sudden


Onset Date: 20


Onset Time: 11:10


Duration: Constant


Location: Reports: Head, Face, Neck, Upper Extremity, Left, Upper Extremity, 

Right.  Denies: Chest, Abdomen, Back, Pelvis, Lower Extremity, Left, Lower 

Extremity, Right, Radiates to


Quality: Reports: Same as Previous Episode, Throbbing


Severity: Severe


Improves with: Reports: Rest


Worsens with: Reports: Movement


Context: Reports: Trauma (As above)


Associated Symptoms: Reports: Headaches, Nausea/Vomiting (No emesis), Shortness 

of Breath.  Denies: Confusion, Chest Pain, Cough, Diaphoresis, Fever/Chills, 

Loss of Appetite, Malaise, Rash, Seizure, Syncope, Weakness


Treatments PTA: Reports: Dressing(s)


  ** neck


Pain Score (Numeric/FACES): 10





  ** Headache


Pain Score (Numeric/FACES): 10





- Related Data


 Allergies











Allergy/AdvReac Type Severity Reaction Status Date / Time


 


No Known Allergies Allergy   Verified 20 12:58











Home Meds: 


 Home Meds





Acetaminophen [Acetaminophen Extra Strength] 1,000 mg PO BID PRN #1 bottle  [Rx]


Aspirin 325 mg PO DAILY  tablet 19 [Rx]


Cholecalciferol (Vitamin D3) [Vitamin D3] 25 mcg PO DAILY  tablet 19 [Rx]


Cyanocobalamin (Vitamin B12) [Vitamin B12] 1,000 mcg PO DAILY  tablet 19 [

Rx]


Latanoprost [Xalatan 0.005% Ophth Soln] 0 ml EYEBOTH BEDTIME  bottle 19 [

Rx]


Magnesium Oxide 400 mg PO DAILY #30 tablet 19 [Rx]


Milk Thistle Seed Extract [Milk Thistle] 100 mg PO DAILY 19 [Rx]


Ubidecarenone [Coenzyme Q10] 100 mg PO DAILY  cap 19 [Rx]


carvediloL [Coreg] 12.5 mg PO BID #60 tablet 19 [Rx]


lisinopriL [Prinivil] 5 mg PO DAILY #30 tablet 19 [Rx]


metFORMIN [Glucophage] 1,000 mg PO BID #60 tablet 19 [Rx]


polyethylene glycoL 3350 [MiraLAX] 17 gm PO DAILY PRN 20 [History]











Past Medical History


HEENT History: Reports: Allergic Rhinitis, Cataract, Glaucoma, Hard of Hearing, 

Impaired Vision, Other (See Below).  Denies: Macular Degeneration, Otitis Media

, Retinal Detachment


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 

current hearing aids. Previous nasal fracture.


Cardiovascular History: Reports: Arrhythmia, CAD, Cardiomyopathy, Heart Failure

, High Cholesterol, Hypertension, PVD, Other (See Below).  Denies: Afib, 

Aneurysm, Blood Clots/VTE/DVT, Bypass, MI, PTCA, Syncope


Other Cardiovascular History: Incomplete right bundle branch block, left and 

right ventricular hypertrophy with left ventricular systolic dysfunction by 

echocardiogram on 02, coronary artery disease by echocardiogram with 

negative heart catheterization, dyslipidemia, mild carotid occlusive disease. 

First-degree AV block. D-dimer elevation. PVCs.


Respiratory History: Reports: Bronchitis, Recurrent, COPD, Intubation, Previous

, Pulmonary Fibrosis, Other (See Below).  Denies: Asthma, Intubation, Difficult

, PE, Pneumothorax, Sleep Apnea, TB


Other Respiratory History: COPD and mild pulmonary fibrosis by chest x-ray with 

no current nebulizer therapy. Severe pneumonia on 3/13/17 with secondary sepsis 

and septic shock.


Gastrointestinal History: Reports: Cholelithiasis, Chronic Diarrhea, Colon Polyp

, Diverticulosis, Pancreatitis, Other (See Below).  Denies: Celiac Disease, 

Chronic Constipation, Fatty Liver, Fecal Incontinence, Gastritis, GERD, GI Bleed

, Hepatitis, Hiatal Hernia, Irritable Bowel Syndrome, Jaundice, PUD


Other Gastrointestinal History: Nonsymptomatic cholelithiasis with no surgery 

required diagnosed by CT scan on 12, pancreatitis with mild secondary ileus 

on 12, excision of 3 benign hyperplastic polyps from the rectosigmoid 

region a colonoscopy on 16, sigmoid diverticulosis


Genitourinary History: Reports: Acute Renal Failure, BPH, Chronic Renal 

Insuffiency, Diabetic Nephropathy, Urinary Incontinence, UTI, Recurrent, Other (

See Below).  Denies: Renal Calculus, STD


Other Genitourinary History: Bladder diverticulum with recurrent UTIs initially 

diagnosed on 6/3/02, mild proteinuria secondary to his diabetes, erectile 

dysfunction, left-sided hydrocele, urosepsis in the . Acute renal failure 

on top of previous chronic renal insufficiency at time of sepsis in 2017 

as above.


Musculoskeletal History: Reports: Arthritis, Back Pain, Chronic, Fracture, Gout

, Neck Pain, Chronic, Osteoarthritis, Osteoporosis, RA, Other (See Below).  

Denies: Amputation, SLE


Other Musculoskeletal History: Left hip intertrochanteric fracture with minimal 

comminuted major trochanter involvement by CT scan on 19. Positive ISAAC on  with no history of lupus; polymyalgia rheumatica, bilateral pes planus, 

clavicular fracture at age 10side unknown. Mild scoliosis.


Neurological History: Reports: Neuropathy, Diabetic, Neuropathy, Peripheral.  

Denies: Alzheimers Disease, Cerebral Aneurysms, Concussion, CVA, Headaches, 

Chronic, Head Trauma, Migraines, MS, Parkinson's, Seizure, TIA, Vertigo


Psychiatric History: Reports: Addiction, Other (See Below).  Denies: Abuse, 

Victim of, ADD, ADHD, Anxiety, Depression, Psych Hospitalization(s), Psychosis, 

PTSD, Suicide Attempt, Suicidal Ideation


Other Psychiatric History: Borderline alcohol abuse history as below.


Endocrine/Metabolic History: Reports: Diabetes, Type II, Hypomagnesemia, 

Osteoporosis, Other (See Below).  Denies: Diabetes, Type I, Diabetes Mellitus, 

Type 3c, Hypothyroidism, IDDM


Other Endocrine/Metabolic History: Gynecomastia. Hypoalbuminemia.


Hematologic History: Reports: Anemia, B12 Deficiency, Blood Transfusion(s), 

Other (See Below).  Denies: Iron Deficiency


Other Hematologic History: transfusion of 2 units on 04 postoperative 

from total knee arthroplasty as below


Immunologic History: Reports: None.  Denies: AIDS, HIV, SLE


Oncologic (Cancer) History: Reports: Bladder, Other (See Below).  Denies: Basal 

Cell Carcinoma, Colon, Hodgkin's Lymphoma, Leukemia, Lymphoma, Malignant 

Melanoma, Non-Hodgkin's Lymphoma, Prostate


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


Dermatologic History: Reports: Other (See Below).  Denies: Eczema, Psoriasis


Other Dermatologic History: Actinic keratosis.





- Infectious Disease History


Infectious Disease History: Reports: Chicken Pox.  Denies: C-Difficile, Measles

, Meningitis, Mononucleosis, MRSA, Mumps, Pertussis (Whooping Cough), Rubella, 

Scarlet Fever, Shingles, TB, VRE





- Past Surgical History


Head Surgeries/Procedures: Reports: None


HEENT Surgical History: Reports: Adenoidectomy, Cataract Surgery, Eye Surgery, 

Oral Surgery, Tonsillectomy, Other (See Below)


Other HEENT Surgeries/Procedures: Right-sided cataract surgery on 17. Left-

sided cataract surgery on 3/13/17. Tonsillectomy and adenoidectomy in 1949. 

[place] teeth extraction 4 in 1952. Additional multiple teeth extractions.


Cardiovascular Surgical History: Reports: None.  Denies: Varicose


Respiratory Surgical History: Reports: None.  Denies: Thoracentesis


GI Surgical History: Reports: Colonoscopy, Hernia, Inguinal, Polypectomy, Other 

(See Below).  Denies: Appendectomy, Cholecystectomy, EGD, Hernia, Abdominal, 

Hernia Repair/Other


Other GI Surgeries/Procedures: Last colonoscopy on 3/27/12 with previous 

colonoscopy and polypectomy as above on 06. Right inguinal hernia repair 

on 12.


Male  Surgical History: Reports: TUIP, Other (See Below).  Denies: 

Circumcision, Vasectomy


Other Male  Surgeries/Procedures: Right testicular orchiectomy for benign 

disease in . A left hydrocele repair on 05. TUIP with fulguration of 

bladder diverticulum on 02.


Endocrine Surgical History: Reports: None.  Denies: Thyroid Biopsy


Neurological Surgical History: Reports: None.  Denies: C-Spine, Discectomy, 

Laminectomy, Lumbar Spine, Sacral Spine, Spinal Fusion, Thoracic Spine, 

Vertebroplasty


Musculoskeletal Surgical History: Reports: Joint Replacement, Knee Replacement, 

ORIF, Shoulder Surgery, Other (See Below).  Denies: Arthroscopic Knee, 

Arthroscopic Procedure, Carpal Tunnel, Ganglion Cyst, Hip Replacement


Other Musculoskeletal Surgeries/Procedures:: ORIF of left hip fracture in 2019. Bilateral total knee arthroplasty on 12/10/04. Right rotator cuff repair 

on 01.


Oncologic Surgical History: Reports: None


Dermatological Surgical History: Reports: None





- Past Imaging History


Past Imaging History: Reports: Angiography (Negative heart catheterization on 02.), Cardiac Echo (02), Carotid US (07), CAT Scan (CT of the left 

hip on 19 with results as above. CT scan of the abdomen and pelvis on  and 12. CT of the brain on 11.), DEXA Scan (Last DEXA scan on  with previous evaluation on 11.), Stress Testing (Borderline positive 

Cardiolite stress test on 18 with borderline septal ischemia and stable 

inferior wall fixed defect with ejection fraction of 64% and no further heart 

catheterization, etc.), Ultrasound (Abdominal ultrasound on 18. Abdominal 

aortic ultrasound on 3/14/17 and 4/4/07. Bladder ultrasound on 6/3/02.), Venous 

Doppler (Legs bilaterally on 3/14/17.)





Social & Family History





- Family History


HEENT: Reports: None.  Denies: Glaucoma, Macular Degeneration, Retinal 

Detachment


Cardiac: Reports: CAD, MI, Other (See Below).  Denies: Afib, Aneurysm, 

Arrhythmia, Bypass, Cardiomyopathy, Heart Failure, High Cholesterol, 

Hypertension, Stent, Syncope


Other Cardiac Family History: Father with possible fatal MI in his 70s, mother 

with possible fatal MI in her 70s


Respiratory: Reports: None.  Denies: Asthma, COPD, PE, Pneumothorax, Sleep Apnea


GI: Reports: None.  Denies: Celiac Disease, Cholelithiasis, Colon Polyps, GERD, 

GI bleed, Hepatitis, Inflammatory Bowel Disease, Irritable Bowel Syndrome, 

Pancreatitis, PUD


: Reports: None.  Denies: Renal Calculus, Renal Disease/Insufficiency


OBGYN: Reports: None.  Denies: Endometriosis, Recurrent Spontaneous 


Musculoskeletal: Reports: Arthritis, Osteoarthritis, Other (See Below).  Denies

: Gout, RA, SLE


Other Musculoskeletal Family History: Father, 2 paternal aunts with arthritis.


Neurological: Reports: None.  Denies: Alzheimers Disease, CVA, Dementia, 

Migraines, MS, Parkinson's, Seizure, TIA


Psychiatric: Reports: None.  Denies: Abuse, Victim of, ADD, ADHD, Anxiety, 

Depression, Psych Hospitalization(s), PTSD, Suicide Attempt


Endocrine/Metabolic: Reports: Diabetes, type II, Other (See Below).  Denies: 

Diabetes, Type I, Diabetes Mellitus, Type 3c, Hypothyroidism, IDDM


Other Endocrine/Metabolic Family History: Father with possible diabetes mellitus


Hematologic: Reports: None.  Denies: Anemia


Immunologic: Reports: None.  Denies: AIDS, HIV


Dermatologic: Reports: None.  Denies: Eczema, Psoriasis


Oncologic: Reports: Prostate, Other (See Below).  Denies: Bladder, Colon, 

Hodgkin's Lymphoma, Leukemia, Lung, Lymphoma, Metastatic, Non-Hodgkin's Lymphoma

, Pancreatic, Skin


Other Oncologic Family History: Father with possible prostate cancer





- Tobacco Use


Smoking Status *Q: Former Smoker


Tobacco Use Within Last Twelve Months: No


Years of Tobacco use: 14


Packs/Tins Daily: 0.8


Packs/Tins Daily Comment: Smoked between ages 21 and 35 with occasional 

additional cigar use.


Used Tobacco, but Quit: Yes


Smoking Cessation Information Provided To Patient: No


Second Hand Smoke Exposure: No


Second Hand Smoke Education Provided: No





- Caffeine Use


Caffeine Use: Reports: Coffee (2 cups of coffee per day).  Denies: Energy Drinks

, Soda, Tea





- Alcohol Use


Alcohol Use History: Yes


Days Per Week of Alcohol Use: 0


Number of Drinks Per Day: 0


Number of Drinks Per Day Comment: No alcohol use since  with borderline 

alcohol abuse with no previous treatment, etc.


Total Drinks Per Week: 0


Alcohol Use in Last Twelve Months: No





- Recreational Drug Use


Recreational Drug Use: No


Drug Use in Last 12 Months: No


Recreational Drug Type: Denies: Amphetamines (Speed), Cocaine, Heroin, 

Inhalants (Glues, Solvents, Aerosols), LSD (Acid), Marijuana/Hashish, 

Methamphetamine, Morphine, Oxycodone





- Living Situation & Occupation


Living situation: Reports:  (1957, 2 children)


Occupation: Retired (Semiretired farmer and  toy tractor collecter)





ED ROS GENERAL





- Review of Systems


Review Of Systems: Comprehensive ROS is negative, except as noted in HPI.





ED EXAM, GENERAL





- Physical Exam


Exam: See Below


Exam Limited By: No Limitations


General Appearance: Alert, WD/WN, No Apparent Distress


Eye Exam: Left Eye: Periorbital Changes (Moderate right periorbital ecchymosis 

and swelling extending to the right maxillary sinus), Bilateral Eye: Normal 

Fundi, Normal Inspection (No nystagmus), PERRL


Ears: Normal External Exam, Normal Canal, Normal TMs, Hearing Loss (Moderate 

presbycusis)


Nose: No Blood, Nasal Tenderness (Mild), Nasal Deformity, Nasal Swelling (

Moderate with minimal superficial mid dorsal abrasion and mild ecchymosis)


Throat/Mouth: Normal Inspection, Normal Lips, Normal Teeth, Normal Gums, Normal 

Oropharynx, Normal Voice, No Airway Compromise.  No: Dysphagia, Perioral 

Cyanosis


Head: Normocephalic, Facial Swelling (Right periorbital region as above), 

Facial Tenderness (Moderate in the right periorbital region), Sinus Tenderness (

Mild over the maxillary sinus), Other (4 cm in length somewhat deep laceration 

over the right mid superior orbital region with no evidence of crepitation, 

foreign body, fracture, etc. Additional 3 cm superficial laceration/skin tear 

in the right temporal/periorbital region.)


Neck: Limited Range of Motion (Neck immobilization), Tender Midline (Superior).

  No: Lymphadenopathy (L), Lymphadenopathy (R), Tender Lateral


Respiratory/Chest: No Respiratory Distress, Lungs Clear, Normal Breath Sounds, 

No Accessory Muscle Use, Chest Non-Tender.  No: Pleural Rub, Retractions


Cardiovascular: Normal Peripheral Pulses, Regular Rate, Rhythm, No Edema, No 

Gallop, No JVD, No Murmur, No Rub.  No: Gallop/S3, Gallop/S4, Friction Rub


Peripheral Pulses: 2+: Radial (L), Radial (R), Dorsalis Pedis (L), Dorsalis 

Pedis (R)


GI/Abdominal: Normal Bowel Sounds, Soft, Non-Tender, No Organomegaly, No 

Distention, No Abnormal Bruit, No Mass, Pelvis Stable.  No: Guarding


 (Male) Exam: Deferred


Rectal (Males) Exam: Deferred


Back Exam: Normal Inspection, Full Range of Motion.  No: CVA Tenderness (L), 

CVA Tenderness (R), Muscle Spasm


Extremities: No: Arm Pain (Bilateral hand pain with moderate osteoarthritic 

changes with multiple mostly superficial abrasions and skin tears on the dorsal 

and ulnar surfaces of the hands bilaterally including denudement injury of the 

left thumb. Moderate progressive ecchymosis and swelling of the left thumb 

during ER care. Note 1 cm macerated area of digit #2 of the left hand with 0.25 

cm skin tear on the third finger. Right thumb showed a skin tear of about 3.5 

cm in length with additional 2 cm skin tear on the fifth finger 1.25 cm 

lacerations over the dorsal aspect of the second and third fingers, 1.5 cm 

abrasion of the dorsal aspect of the fourth digit)


Neurological: Alert, Oriented, CN II-XII Intact, Normal Cognition, Normal Gait, 

Normal Reflexes (Negative Babinski's, finger to nose, and pronator rotation 

tests.  No evidence of facial paresis, tongue deviation, orthostasis, etc..  

Excellent reverse thought processes.), No Motor/Sensory Deficits


Psychiatric: Normal Affect, Normal Mood


Skin Exam: Ecchymosis (As above and below including moderate ecchymosis of his 

hands bilaterally of greater than right.), Wound/Incision (As above).  No: 

Diaphoretic, Petechiae


Lymphatic: No Adenopathy





ED GENERAL MEDICAL PROCEDURES





- Laceration/Wound Repair


  ** Right Upper Forehead


Lac/wound length in cm: 4.0


Appearance: Subcutaneous, Stellate, Irregular, Clean


Distal NVT: Neuro & Vascular Intact, No Tendon Injury


Anesthetic Type: Local


Local Anesthesia - Lidocaine (Xylocaine): 1% Plain


Local Anesthetic Volume: 5cc


Skin Prep: Providone-Iodine (Betadine)


Saline irrigation (cc's): 0


Exploration/Debridement/Repair: Wound Explored, In a Bloodless Field, Explored 

to Base, No Foreign Material Found, Multiple Flaps Aligned


Closed with: Sutures


Suture Size: 4-0


# of Sutures: 6


Suture Type: Interrupted, Simple


Drain Placement: No


Sterile Dressing Applied: Nurse


Tetanus Status Addressed: Yes


Complications: No





  ** Right Lateral Forehead


Lac/wound length in cm: 3.0


Appearance: Superficial, Irregular, Clean


Distal NVT: Neuro & Vascular Intact


Anesthetic Type: Local


Local Anesthesia - Lidocaine (Xylocaine): 1% Plain


Local Anesthetic Volume: 3cc


Skin Prep: Providone-Iodine (Betadine)


Saline irrigation (cc's): 0


Exploration/Debridement/Repair: Wound Explored, In a Bloodless Field, Explored 

to Base, Multiple Flaps Aligned


Closed with: Sutures


Suture Size: 4-0


# of Sutures: 4


Suture Type: Interrupted, Simple


Drain Placement: No


Sterile Dressing Applied: Nurse


Tetanus Status Addressed: Yes


Complications: No





Course





- Vital Signs


Last Recorded V/S: 


 Last Vital Signs











Temp  36.8 C   20 12:30


 


Pulse  88   20 14:45


 


Resp  15   20 14:45


 


BP  169/86 H  20 14:45


 


Pulse Ox  96   20 14:45











 Vital Signs - 24 hr











  20





  12:30 13:22 13:45


 


Temperature [ 36.8 C  





Oral]   


 


Pulse, 89 78 79





Peripheral [   





Pulse Oximetry]   


 


Respiratory 22 H 16 14





Rate   


 


Blood Pressure 153/96 H 158/79 H 166/81 H





[Left Upper Arm   





]   


 


O2 Sat by Pulse   95





Oximetry   














  20





  14:00 14:25 14:45


 


Temperature [   





Oral]   


 


Pulse, 84 85 88





Peripheral [   





Pulse Oximetry]   


 


Respiratory 15 15 15





Rate   


 


Blood Pressure 156/80 H 171/81 H 169/86 H





[Left Upper Arm   





]   


 


O2 Sat by Pulse 94 L 95 96





Oximetry   














- Orders/Labs/Meds


Orders: 


 Active Orders 24 hr











 Category Date Time Status


 


 Cardiac Monitoring [RC] .AS DIRECTED Care  20 12:45 Active


 


 Peripheral IV Care [RC] .AS DIRECTED Care  20 12:59 Active


 


 Vaccines to be Administered [RC] PER UNIT ROUTINE Care  20 13:07 Active


 


 Cervical Spine 1V [CR] Stat Exams  20 12:30 Taken


 


 Cervical Spine wo Cont [CT] Stat Exams  20 12:30 Taken


 


 Hand Comp Min 3V Lt [CR] Stat Exams  20 13:05 Taken


 


 Head wo Cont [CT] Stat Exams  20 12:29 Taken


 


 Max Facial Sinus wo Cont [CT] Stat Exams  20 12:30 Taken


 


 Sodium Chloride 0.9% [Saline Flush] Med  20 12:59 Active





 10 ml FLUSH ASDIRECTED PRN   


 


 Obtain Past Medical Record [OM.PC] Routine Oth  20 12:28 Active


 


 Peripheral IV Insertion Adult [OM.PC] Routine Oth  20 12:59 Ordered








 Medication Orders





Sodium Chloride (Saline Flush)  10 ml FLUSH ASDIRECTED PRN


   PRN Reason: Keep Vein Open


   Last Admin: 20 14:45 Dose:  10 ml








Labs: 


 Laboratory Tests











  20 Range/Units





  13:00 13:00 13:00 


 


WBC  8.6    (4.0-10.2)  K/uL


 


RBC  3.82 L    (4.33-5.41)  M/uL


 


Hgb  11.3 L    (13.1-16.8)  g/dL


 


Hct  33.9 L    (39.0-49.0)  %


 


MCV  88.7    (84.0-98.0)  fL


 


MCH  29.6    (28.2-33.3)  pg


 


MCHC  33.3    (31.7-36.0)  g/dL


 


RDW  12.8    (11.2-14.1)  %


 


Plt Count  187    (150-350)  K/uL


 


Neut % (Auto)  76.6    (45.0-80.0)  %


 


Lymph % (Auto)  14.4    (10.0-50.0)  %


 


Mono % (Auto)  7.1    (2.0-14.0)  %


 


Eos % (Auto)  1.5    (0.0-5.0)  %


 


Baso % (Auto)  0.4    (0.0-2.0)  %


 


Neut # (Auto)  6.57    (1.40-7.00)  K/uL


 


Lymph # (Auto)  1.23    (0.50-3.50)  K/uL


 


Mono # (Auto)  0.61    (0.00-1.00)  K/uL


 


Eos # (Auto)  0.13    (0.00-0.50)  K/uL


 


Baso # (Auto)  0.03    (0.00-0.20)  K/uL


 


PT    10.6  (9.5-12.0)  SEC


 


INR    1.1  


 


APTT    22.6 L  (24.5-32.8)  SEC


 


Sodium   142   (136-145)  mmol/L


 


Potassium   4.0   (3.5-5.1)  mmol/L


 


Chloride   105   ()  mmol/L


 


Carbon Dioxide   27.3   (21.0-32.0)  mmol/L


 


BUN   19 H   (7-18)  mg/dL


 


Creatinine   0.91   (0.51-1.17)  mg/dL


 


Est Cr Clr Drug Dosing   TNP   


 


Estimated GFR (MDRD)   > 60   mL/min


 


Glucose   141 H   ()  mg/dL


 


Calcium   8.8   (8.5-10.1)  mg/dL


 


Total Bilirubin   0.8   (0.2-1.0)  mg/dL


 


AST   24   (15-37)  U/L


 


ALT   19   (12-78)  U/L


 


Alkaline Phosphatase   62   ()  IU/L


 


Total Protein   6.3 L   (6.4-8.2)  g/dL


 


Albumin   3.6   (3.4-5.0)  g/dL











Meds: 


Medications











Generic Name Dose Route Start Last Admin





  Trade Name Freq  PRN Reason Stop Dose Admin


 


Sodium Chloride  10 ml  20 12:59  20 14:45





  Saline Flush  FLUSH   10 ml





  ASDIRECTED PRN   Administration





  Keep Vein Open   





     





     





     














Discontinued Medications














Generic Name Dose Route Start Last Admin





  Trade Name Freq  PRN Reason Stop Dose Admin


 


Diphtheria/Tetanus/Acell Pertussis  0.5 ml  20 13:07  20 14:02





  Adacel  IM  20 13:08  0.5 ml





  .ONCE ONE   Administration





     





     





     





     


 


Hydromorphone HCl  0.5 mg  20 13:00  20 13:11





  Dilaudid  IVPUSH  20 13:01  0.5 mg





  ONETIME ONE   Administration





     





     





     





     


 


Hydromorphone HCl  0.5 mg  20 14:41  20 14:44





  Dilaudid  IVPUSH  20 14:42  0.5 mg





  ONETIME ONE   Administration





     





     





     





     


 


Hydromorphone HCl  Confirm  20 14:43  





  Dilaudid  Administered  20 14:44  





  Dose   





  0.5 mg   





  .ROUTE   





  .[facility] ONE   





     





     





     





     


 


Lidocaine HCl  5 ml  20 13:33  20 13:43





  Xylocaine-Mpf 1%  INJECT  20 13:34  5 ml





  ONETIME ONE   Administration





     





     





     





     


 


Lidocaine HCl  5 ml  20 13:33  20 13:43





  Xylocaine-Mpf 1%  INJECT  20 13:34  5 ml





  ONETIME ONE   Administration





     





     





     





     


 


Neomycin/Polymyxin/Bacitracin  10 each  20 13:33  20 13:43





  Triple Antibiotic Oint  TOP  20 13:34  10 each





  ONETIME ONE   Administration





     





     





     





     


 


Ondansetron HCl  4 mg  20 12:59  20 13:11





  Zofran  IVPUSH  20 13:00  4 mg





  ONETIME ONE   Administration





     





     





     





     


 


Ondansetron HCl  4 mg  20 14:56  





  Zofran  IVPUSH  20 14:57  





  ONETIME ONE   





     





     





     





     


 


Ondansetron HCl  Confirm  20 14:58  





  Zofran  Administered  20 14:59  





  Dose   





  4 mg   





  .ROUTE   





  .[facility] ONE   





     





     





     





     














- Radiology Interpretation


Free Text/Narrative:: 





Cardiac monitor shows normal sinus rhythm with heart rate in the 70s to 80s 

with no ectopy or arrhythmia. Evidence of first-degree AV block. 





X-rays of the C-spine, lateral view only, shows an incomplete and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